# Patient Record
Sex: FEMALE | Race: WHITE | URBAN - METROPOLITAN AREA
[De-identification: names, ages, dates, MRNs, and addresses within clinical notes are randomized per-mention and may not be internally consistent; named-entity substitution may affect disease eponyms.]

---

## 2021-01-01 ENCOUNTER — OFFICE VISIT (OUTPATIENT)
Dept: ORTHOPEDIC SURGERY | Age: 86
End: 2021-01-01
Payer: MEDICARE

## 2021-01-01 VITALS — WEIGHT: 164 LBS | BODY MASS INDEX: 28 KG/M2 | HEIGHT: 64 IN

## 2021-01-01 DIAGNOSIS — M84.375G STRESS REACTION OF LEFT FOOT WITH DELAYED HEALING, SUBSEQUENT ENCOUNTER: Primary | ICD-10-CM

## 2021-01-01 PROCEDURE — 99212 OFFICE O/P EST SF 10 MIN: CPT | Performed by: ORTHOPAEDIC SURGERY

## 2021-01-01 PROCEDURE — G8536 NO DOC ELDER MAL SCRN: HCPCS | Performed by: ORTHOPAEDIC SURGERY

## 2021-01-01 PROCEDURE — G8419 CALC BMI OUT NRM PARAM NOF/U: HCPCS | Performed by: ORTHOPAEDIC SURGERY

## 2021-01-01 PROCEDURE — G8432 DEP SCR NOT DOC, RNG: HCPCS | Performed by: ORTHOPAEDIC SURGERY

## 2021-01-01 PROCEDURE — 1090F PRES/ABSN URINE INCON ASSESS: CPT | Performed by: ORTHOPAEDIC SURGERY

## 2021-01-01 PROCEDURE — 1101F PT FALLS ASSESS-DOCD LE1/YR: CPT | Performed by: ORTHOPAEDIC SURGERY

## 2021-01-01 PROCEDURE — G8427 DOCREV CUR MEDS BY ELIG CLIN: HCPCS | Performed by: ORTHOPAEDIC SURGERY

## 2021-01-01 RX ORDER — CARVEDILOL 3.12 MG/1
TABLET ORAL
COMMUNITY
Start: 2021-01-01 | End: 2022-01-01

## 2021-01-01 RX ORDER — CALCITRIOL 0.25 UG/1
CAPSULE ORAL
COMMUNITY
Start: 2021-01-01 | End: 2022-01-01

## 2021-01-01 RX ORDER — ALBUTEROL SULFATE 0.83 MG/ML
SOLUTION RESPIRATORY (INHALATION)
COMMUNITY
Start: 2021-01-01 | End: 2022-01-01

## 2021-01-01 RX ORDER — AMLODIPINE BESYLATE 5 MG/1
TABLET ORAL
COMMUNITY
End: 2022-01-01

## 2021-01-01 RX ORDER — ALBUTEROL SULFATE 90 UG/1
AEROSOL, METERED RESPIRATORY (INHALATION)
COMMUNITY
End: 2021-01-01

## 2021-12-02 NOTE — PROGRESS NOTES
Tadeo Alcantara (: 1935) is a 80 y.o. female, patient,here for evaluation of the following No chief complaint on file. ASSESSMENT/PLAN:  Below is the assessment and plan developed based on review of pertinent history, physical exam, labs, studies, and medications. 1. Stress reaction of left foot with delayed healing, subsequent encounter      Patient continues to have symptoms around the second metatarsal and toe, she was diagnosed with a stress reaction as no fracture lines were seen. She has had some improvements but not fully recovered, I did refer to physical therapy and will see her again for final check, will obtain final x-rays of left foot 3 views weightbearing. Return in about 6 weeks (around 2022) for repeat xrays. Not on File    No current outpatient medications on file. No current facility-administered medications for this visit. No past medical history on file. No past surgical history on file. No family history on file. Social History     Socioeconomic History    Marital status:      Spouse name: Not on file    Number of children: Not on file    Years of education: Not on file    Highest education level: Not on file   Occupational History    Not on file   Tobacco Use    Smoking status: Not on file    Smokeless tobacco: Not on file   Substance and Sexual Activity    Alcohol use: Not on file    Drug use: Not on file    Sexual activity: Not on file   Other Topics Concern    Not on file   Social History Narrative    Not on file     Social Determinants of Health     Financial Resource Strain:     Difficulty of Paying Living Expenses: Not on file   Food Insecurity:     Worried About Running Out of Food in the Last Year: Not on file    Beth of Food in the Last Year: Not on file   Transportation Needs:     Lack of Transportation (Medical): Not on file    Lack of Transportation (Non-Medical):  Not on file   Physical Activity:     Days of Exercise per Week: Not on file    Minutes of Exercise per Session: Not on file   Stress:     Feeling of Stress : Not on file   Social Connections:     Frequency of Communication with Friends and Family: Not on file    Frequency of Social Gatherings with Friends and Family: Not on file    Attends Taoism Services: Not on file    Active Member of [address] [place] [address] [employer] or Organizations: Not on file    Attends Club or Organization Meetings: Not on file    Marital Status: Not on file   Intimate Partner Violence:     Fear of Current or Ex-Partner: Not on file    Emotionally Abused: Not on file    Physically Abused: Not on file    Sexually Abused: Not on file   Housing Stability:     Unable to Pay for Housing in the Last Year: Not on file    Number of [place] in the Last Year: Not on file    Unstable Housing in the Last Year: Not on file           Vitals: There were no vitals taken for this visit. There is no height or weight on file to calculate BMI. SUBJECTIVE/OBJECTIVE:  Sourav Mirza (: 1935) patient here for follow-up regarding left foot pain diagnosis stress reaction around the second toe and metatarsal.  She is doing better today but not fully recovered. She was last seen for this on 2021. She continues to do well with the boot immobilization. Patient is not diabetic. Physical Exam  Pleasant well-nourished female , alert and oriented to person, time and place, no acute distress. Mild antalgic gait, normal weightbearing stance. Left ankle: Full active and passive range of motion intact, nontender, no swelling, normal exam.    Left foot:  around the second toe and metatarsal, no obvious swelling is present, no malalignment or deformities, hindfoot and toes nontender for the rest of the foot. Able to flex and extend toes suspect range of motion strength 5/5. Contralateral foot and ankle exam, nontender, no swelling ligaments grossly stable.   Normal weightbearing stance. Neurovascular exam intact for light touch sensation, cap refill, dorsalis pedis pulse palpable, flexion/extension strength 5/5. Skin intact without open wounds, lesions or ulcers, no skin discolorations, no warmth to skin. Imaging:    No x-rays were obtained, previous x-rays have been normal.        An electronic signature was used to authenticate this note.   -- Jonathan Stein MD

## 2022-01-01 ENCOUNTER — HOME CARE VISIT (OUTPATIENT)
Dept: HOSPICE | Facility: HOSPICE | Age: 87
End: 2022-01-01
Payer: MEDICARE

## 2022-01-01 ENCOUNTER — HOME CARE VISIT (OUTPATIENT)
Dept: SCHEDULING | Facility: HOME HEALTH | Age: 87
End: 2022-01-01
Payer: MEDICARE

## 2022-01-01 ENCOUNTER — HOSPICE ADMISSION (OUTPATIENT)
Dept: HOSPICE | Facility: HOSPICE | Age: 87
End: 2022-01-01
Payer: MEDICARE

## 2022-01-01 ENCOUNTER — HOSPITAL ENCOUNTER (INPATIENT)
Age: 87
LOS: 3 days | Discharge: HOME | End: 2022-02-08
Attending: FAMILY MEDICINE | Admitting: INTERNAL MEDICINE

## 2022-01-01 VITALS
DIASTOLIC BLOOD PRESSURE: 76 MMHG | TEMPERATURE: 97.9 F | OXYGEN SATURATION: 98 % | SYSTOLIC BLOOD PRESSURE: 118 MMHG | RESPIRATION RATE: 16 BRPM | HEART RATE: 95 BPM

## 2022-01-01 VITALS
TEMPERATURE: 98.2 F | RESPIRATION RATE: 16 BRPM | DIASTOLIC BLOOD PRESSURE: 74 MMHG | HEART RATE: 86 BPM | OXYGEN SATURATION: 99 % | SYSTOLIC BLOOD PRESSURE: 118 MMHG

## 2022-01-01 VITALS
HEART RATE: 72 BPM | OXYGEN SATURATION: 97 % | RESPIRATION RATE: 18 BRPM | DIASTOLIC BLOOD PRESSURE: 59 MMHG | TEMPERATURE: 97.2 F | SYSTOLIC BLOOD PRESSURE: 114 MMHG

## 2022-01-01 VITALS — TEMPERATURE: 99.4 F | OXYGEN SATURATION: 80 % | HEART RATE: 93 BPM | RESPIRATION RATE: 24 BRPM

## 2022-01-01 VITALS
TEMPERATURE: 98 F | DIASTOLIC BLOOD PRESSURE: 78 MMHG | RESPIRATION RATE: 18 BRPM | HEART RATE: 86 BPM | HEART RATE: 140 BPM | TEMPERATURE: 98 F | SYSTOLIC BLOOD PRESSURE: 122 MMHG | RESPIRATION RATE: 8 BRPM | OXYGEN SATURATION: 94 %

## 2022-01-01 VITALS
HEART RATE: 100 BPM | DIASTOLIC BLOOD PRESSURE: 70 MMHG | OXYGEN SATURATION: 96 % | TEMPERATURE: 98.7 F | SYSTOLIC BLOOD PRESSURE: 115 MMHG | RESPIRATION RATE: 18 BRPM

## 2022-01-01 VITALS — HEART RATE: 110 BPM | TEMPERATURE: 99 F | RESPIRATION RATE: 10 BRPM

## 2022-01-01 VITALS
DIASTOLIC BLOOD PRESSURE: 59 MMHG | HEART RATE: 93 BPM | TEMPERATURE: 98.7 F | RESPIRATION RATE: 18 BRPM | SYSTOLIC BLOOD PRESSURE: 93 MMHG | OXYGEN SATURATION: 99 %

## 2022-01-01 VITALS
OXYGEN SATURATION: 99 % | DIASTOLIC BLOOD PRESSURE: 67 MMHG | TEMPERATURE: 97.6 F | HEART RATE: 83 BPM | RESPIRATION RATE: 16 BRPM | SYSTOLIC BLOOD PRESSURE: 126 MMHG

## 2022-01-01 VITALS — HEART RATE: 112 BPM | RESPIRATION RATE: 10 BRPM | TEMPERATURE: 98 F

## 2022-01-01 VITALS — TEMPERATURE: 98.2 F | HEART RATE: 140 BPM | RESPIRATION RATE: 22 BRPM

## 2022-01-01 VITALS — TEMPERATURE: 98.1 F | RESPIRATION RATE: 16 BRPM | OXYGEN SATURATION: 98 % | HEART RATE: 96 BPM

## 2022-01-01 VITALS
TEMPERATURE: 98.1 F | SYSTOLIC BLOOD PRESSURE: 119 MMHG | HEART RATE: 110 BPM | RESPIRATION RATE: 18 BRPM | OXYGEN SATURATION: 92 % | DIASTOLIC BLOOD PRESSURE: 66 MMHG

## 2022-01-01 VITALS
OXYGEN SATURATION: 99 % | RESPIRATION RATE: 16 BRPM | DIASTOLIC BLOOD PRESSURE: 74 MMHG | HEART RATE: 94 BPM | TEMPERATURE: 97.8 F | SYSTOLIC BLOOD PRESSURE: 117 MMHG

## 2022-01-01 VITALS
DIASTOLIC BLOOD PRESSURE: 78 MMHG | OXYGEN SATURATION: 99 % | RESPIRATION RATE: 18 BRPM | SYSTOLIC BLOOD PRESSURE: 136 MMHG | HEART RATE: 79 BPM | TEMPERATURE: 97.2 F

## 2022-01-01 VITALS
SYSTOLIC BLOOD PRESSURE: 122 MMHG | RESPIRATION RATE: 22 BRPM | DIASTOLIC BLOOD PRESSURE: 68 MMHG | OXYGEN SATURATION: 96 % | HEART RATE: 98 BPM

## 2022-01-01 VITALS — TEMPERATURE: 98.1 F | HEART RATE: 106 BPM | RESPIRATION RATE: 12 BRPM

## 2022-01-01 VITALS
SYSTOLIC BLOOD PRESSURE: 98 MMHG | TEMPERATURE: 98.7 F | DIASTOLIC BLOOD PRESSURE: 72 MMHG | HEART RATE: 120 BPM | RESPIRATION RATE: 20 BRPM | OXYGEN SATURATION: 97 %

## 2022-01-01 VITALS — HEART RATE: 110 BPM

## 2022-01-01 DIAGNOSIS — R11.0 NAUSEA: ICD-10-CM

## 2022-01-01 DIAGNOSIS — R10.9 ABDOMINAL PAIN, UNSPECIFIED ABDOMINAL LOCATION: ICD-10-CM

## 2022-01-01 DIAGNOSIS — R53.83 FATIGUE, UNSPECIFIED TYPE: ICD-10-CM

## 2022-01-01 DIAGNOSIS — K59.03 DRUG-INDUCED CONSTIPATION: ICD-10-CM

## 2022-01-01 DIAGNOSIS — R07.9 CHEST PAIN, UNSPECIFIED TYPE: ICD-10-CM

## 2022-01-01 DIAGNOSIS — I25.119 ATHEROSCLEROSIS OF NATIVE CORONARY ARTERY OF NATIVE HEART WITH ANGINA PECTORIS (HCC): ICD-10-CM

## 2022-01-01 DIAGNOSIS — Z51.5 HOSPICE CARE: ICD-10-CM

## 2022-01-01 DIAGNOSIS — K13.70 ORAL LESION: ICD-10-CM

## 2022-01-01 DIAGNOSIS — G47.00 INSOMNIA, UNSPECIFIED TYPE: ICD-10-CM

## 2022-01-01 DIAGNOSIS — F41.9 ANXIETY: ICD-10-CM

## 2022-01-01 PROCEDURE — G0299 HHS/HOSPICE OF RN EA 15 MIN: HCPCS

## 2022-01-01 PROCEDURE — 74011000250 HC RX REV CODE- 250: Performed by: NURSE PRACTITIONER

## 2022-01-01 PROCEDURE — 0651 HSPC ROUTINE HOME CARE

## 2022-01-01 PROCEDURE — HOSPICE MEDICATION HC HH HOSPICE MEDICATION

## 2022-01-01 PROCEDURE — G0300 HHS/HOSPICE OF LPN EA 15 MIN: HCPCS

## 2022-01-01 PROCEDURE — 74011250637 HC RX REV CODE- 250/637: Performed by: NURSE PRACTITIONER

## 2022-01-01 PROCEDURE — 74011250637 HC RX REV CODE- 250/637: Performed by: INTERNAL MEDICINE

## 2022-01-01 PROCEDURE — G0156 HHCP-SVS OF AIDE,EA 15 MIN: HCPCS

## 2022-01-01 PROCEDURE — 74011250636 HC RX REV CODE- 250/636: Performed by: NURSE PRACTITIONER

## 2022-01-01 PROCEDURE — 0656 HSPC GENERAL INPATIENT

## 2022-01-01 PROCEDURE — G0155 HHCP-SVS OF CSW,EA 15 MIN: HCPCS

## 2022-01-01 PROCEDURE — 3336500001 HSPC ELECTION

## 2022-01-01 PROCEDURE — 3331090004 HSPC SERVICE INTENSITY ADD-ON

## 2022-01-01 PROCEDURE — 99233 SBSQ HOSP IP/OBS HIGH 50: CPT | Performed by: INTERNAL MEDICINE

## 2022-01-01 RX ORDER — POLYETHYLENE GLYCOL 3350 17 G/17G
17 POWDER, FOR SOLUTION ORAL DAILY
Status: DISCONTINUED | OUTPATIENT
Start: 2022-01-01 | End: 2022-01-01 | Stop reason: HOSPADM

## 2022-01-01 RX ORDER — ACETAMINOPHEN 325 MG/1
650 TABLET ORAL
Status: DISCONTINUED | OUTPATIENT
Start: 2022-01-01 | End: 2022-01-01 | Stop reason: HOSPADM

## 2022-01-01 RX ORDER — HALOPERIDOL 2 MG/ML
2 SOLUTION ORAL
Status: DISCONTINUED | OUTPATIENT
Start: 2022-01-01 | End: 2022-01-01 | Stop reason: HOSPADM

## 2022-01-01 RX ORDER — ONDANSETRON 4 MG/1
4 TABLET, ORALLY DISINTEGRATING ORAL
Status: DISCONTINUED | OUTPATIENT
Start: 2022-01-01 | End: 2022-01-01 | Stop reason: HOSPADM

## 2022-01-01 RX ORDER — HYDROMORPHONE HYDROCHLORIDE 5 MG/5ML
0.5 SOLUTION ORAL EVERY 4 HOURS
Status: DISCONTINUED | OUTPATIENT
Start: 2022-01-01 | End: 2022-01-01

## 2022-01-01 RX ORDER — IPRATROPIUM BROMIDE AND ALBUTEROL SULFATE 2.5; .5 MG/3ML; MG/3ML
3 SOLUTION RESPIRATORY (INHALATION)
Status: DISCONTINUED | OUTPATIENT
Start: 2022-01-01 | End: 2022-01-01 | Stop reason: HOSPADM

## 2022-01-01 RX ORDER — HYDROMORPHONE HYDROCHLORIDE 5 MG/5ML
2 SOLUTION ORAL
Status: DISCONTINUED | OUTPATIENT
Start: 2022-01-01 | End: 2022-01-01 | Stop reason: HOSPADM

## 2022-01-01 RX ORDER — HYDROMORPHONE HYDROCHLORIDE 1 MG/ML
0.5 INJECTION, SOLUTION INTRAMUSCULAR; INTRAVENOUS; SUBCUTANEOUS
Status: DISCONTINUED | OUTPATIENT
Start: 2022-01-01 | End: 2022-01-01

## 2022-01-01 RX ORDER — HYDROMORPHONE HYDROCHLORIDE 5 MG/5ML
1 SOLUTION ORAL EVERY 4 HOURS
Status: DISCONTINUED | OUTPATIENT
Start: 2022-01-01 | End: 2022-01-01 | Stop reason: HOSPADM

## 2022-01-01 RX ORDER — DIAZEPAM 2 MG/1
2 TABLET ORAL
Status: DISCONTINUED | OUTPATIENT
Start: 2022-01-01 | End: 2022-01-01 | Stop reason: HOSPADM

## 2022-01-01 RX ORDER — HYOSCYAMINE SULFATE 0.12 MG/1
0.12 TABLET SUBLINGUAL
Status: DISCONTINUED | OUTPATIENT
Start: 2022-01-01 | End: 2022-01-01 | Stop reason: HOSPADM

## 2022-01-01 RX ORDER — FACIAL-BODY WIPES
10 EACH TOPICAL DAILY PRN
Status: DISCONTINUED | OUTPATIENT
Start: 2022-01-01 | End: 2022-01-01 | Stop reason: HOSPADM

## 2022-01-01 RX ORDER — PROCHLORPERAZINE MALEATE 5 MG
10 TABLET ORAL
Status: DISCONTINUED | OUTPATIENT
Start: 2022-01-01 | End: 2022-01-01 | Stop reason: HOSPADM

## 2022-01-01 RX ORDER — ACETAMINOPHEN 650 MG/1
650 SUPPOSITORY RECTAL
Status: DISCONTINUED | OUTPATIENT
Start: 2022-01-01 | End: 2022-01-01 | Stop reason: HOSPADM

## 2022-01-01 RX ORDER — TRIAMCINOLONE ACETONIDE 1 MG/G
PASTE DENTAL 2 TIMES DAILY
Qty: 5 G | Refills: 1 | Status: SHIPPED | OUTPATIENT
Start: 2022-01-01

## 2022-01-01 RX ORDER — HYDROMORPHONE HYDROCHLORIDE 5 MG/5ML
1 SOLUTION ORAL
Status: DISCONTINUED | OUTPATIENT
Start: 2022-01-01 | End: 2022-01-01

## 2022-01-01 RX ORDER — AMOXICILLIN 250 MG
1 CAPSULE ORAL 2 TIMES DAILY
Status: DISCONTINUED | OUTPATIENT
Start: 2022-01-01 | End: 2022-01-01

## 2022-01-01 RX ADMIN — Medication 10 MG: at 10:45

## 2022-01-01 RX ADMIN — ACETAMINOPHEN 650 MG: 325 TABLET ORAL at 08:01

## 2022-01-01 RX ADMIN — Medication 5 ML: at 21:02

## 2022-01-01 RX ADMIN — Medication 10 MG: at 14:45

## 2022-01-01 RX ADMIN — Medication 1 MG: at 13:59

## 2022-01-01 RX ADMIN — Medication 10 MG: at 10:25

## 2022-01-01 RX ADMIN — HYDROMORPHONE HYDROCHLORIDE 0.5 MG: 5 SOLUTION ORAL at 16:27

## 2022-01-01 RX ADMIN — HYDROMORPHONE HYDROCHLORIDE 0.5 MG: 5 SOLUTION ORAL at 12:19

## 2022-01-01 RX ADMIN — Medication 10 MG: at 10:30

## 2022-01-01 RX ADMIN — NITROGLYCERIN 2 INCH: 20 OINTMENT TOPICAL at 14:18

## 2022-01-01 RX ADMIN — HYDROMORPHONE HYDROCHLORIDE 1 MG: 5 SOLUTION ORAL at 10:52

## 2022-01-01 RX ADMIN — NITROGLYCERIN 2 INCH: 20 OINTMENT TOPICAL at 07:53

## 2022-01-01 RX ADMIN — HYDROMORPHONE HYDROCHLORIDE 0.5 MG: 1 INJECTION, SOLUTION INTRAMUSCULAR; INTRAVENOUS; SUBCUTANEOUS at 13:20

## 2022-01-01 RX ADMIN — Medication 1 MG: at 10:50

## 2022-01-01 RX ADMIN — NITROGLYCERIN 2 INCH: 20 OINTMENT TOPICAL at 14:01

## 2022-01-01 RX ADMIN — HYOSCYAMINE SULFATE 0.12 MG: 0.12 TABLET SUBLINGUAL at 09:45

## 2022-01-01 RX ADMIN — NITROGLYCERIN 2 INCH: 20 OINTMENT TOPICAL at 02:00

## 2022-01-01 RX ADMIN — NITROGLYCERIN 2 INCH: 20 OINTMENT TOPICAL at 20:03

## 2022-01-01 RX ADMIN — NITROGLYCERIN 2 INCH: 20 OINTMENT TOPICAL at 20:07

## 2022-01-01 RX ADMIN — HYDROMORPHONE HYDROCHLORIDE 1 MG: 5 SOLUTION ORAL at 12:50

## 2022-01-01 RX ADMIN — Medication 1 MG: at 18:05

## 2022-01-01 RX ADMIN — Medication 10 MG: at 09:30

## 2022-01-01 RX ADMIN — Medication 1 MG: at 02:00

## 2022-01-01 RX ADMIN — IPRATROPIUM BROMIDE AND ALBUTEROL SULFATE 3 ML: .5; 2.5 SOLUTION RESPIRATORY (INHALATION) at 16:32

## 2022-01-01 RX ADMIN — HYDROMORPHONE HYDROCHLORIDE 0.5 MG: 5 SOLUTION ORAL at 05:03

## 2022-01-01 RX ADMIN — NITROGLYCERIN 2 INCH: 20 OINTMENT TOPICAL at 08:47

## 2022-01-01 RX ADMIN — DOCUSATE SODIUM 50 MG AND SENNOSIDES 8.6 MG 1 TABLET: 8.6; 5 TABLET, FILM COATED ORAL at 08:52

## 2022-01-01 RX ADMIN — Medication 5 ML: at 08:46

## 2022-01-01 RX ADMIN — ACETAMINOPHEN 650 MG: 325 TABLET ORAL at 10:18

## 2022-01-01 RX ADMIN — DOCUSATE SODIUM 50 MG AND SENNOSIDES 8.6 MG 1 TABLET: 8.6; 5 TABLET, FILM COATED ORAL at 14:18

## 2022-01-01 RX ADMIN — POLYETHYLENE GLYCOL 3350 17 G: 17 POWDER, FOR SOLUTION ORAL at 08:47

## 2022-01-01 RX ADMIN — HYDROMORPHONE HYDROCHLORIDE 0.5 MG: 1 INJECTION, SOLUTION INTRAMUSCULAR; INTRAVENOUS; SUBCUTANEOUS at 10:34

## 2022-01-01 RX ADMIN — HYDROMORPHONE HYDROCHLORIDE 0.5 MG: 5 SOLUTION ORAL at 00:53

## 2022-01-01 RX ADMIN — DIAZEPAM 2 MG: 2 TABLET ORAL at 06:37

## 2022-01-01 RX ADMIN — NITROGLYCERIN 2 INCH: 20 OINTMENT TOPICAL at 14:09

## 2022-01-01 RX ADMIN — POLYETHYLENE GLYCOL 3350 17 G: 17 POWDER, FOR SOLUTION ORAL at 07:52

## 2022-01-01 RX ADMIN — HYDROMORPHONE HYDROCHLORIDE 1 MG: 5 SOLUTION ORAL at 10:05

## 2022-01-01 RX ADMIN — NITROGLYCERIN 2 INCH: 20 OINTMENT TOPICAL at 20:05

## 2022-01-01 RX ADMIN — NITROGLYCERIN 2 INCH: 20 OINTMENT TOPICAL at 08:52

## 2022-01-01 RX ADMIN — Medication 1 MG: at 06:18

## 2022-01-01 RX ADMIN — DIAZEPAM 2 MG: 2 TABLET ORAL at 00:59

## 2022-01-01 RX ADMIN — HALOPERIDOL 2 MG: 2 SOLUTION ORAL at 10:00

## 2022-01-01 RX ADMIN — HYDROMORPHONE HYDROCHLORIDE 0.5 MG: 5 SOLUTION ORAL at 20:41

## 2022-01-01 RX ADMIN — HYDROMORPHONE HYDROCHLORIDE 0.5 MG: 1 INJECTION, SOLUTION INTRAMUSCULAR; INTRAVENOUS; SUBCUTANEOUS at 20:06

## 2022-01-01 RX ADMIN — NITROGLYCERIN 2 INCH: 20 OINTMENT TOPICAL at 01:49

## 2022-01-01 RX ADMIN — HYDROMORPHONE HYDROCHLORIDE 2 MG: 5 SOLUTION ORAL at 12:51

## 2022-01-01 RX ADMIN — DIAZEPAM 2 MG: 2 TABLET ORAL at 21:57

## 2022-01-01 RX ADMIN — HYDROMORPHONE HYDROCHLORIDE 2 MG: 5 SOLUTION ORAL at 10:00

## 2022-01-01 RX ADMIN — HYOSCYAMINE SULFATE 0.12 MG: 0.12 TABLET SUBLINGUAL at 14:30

## 2022-01-01 RX ADMIN — NITROGLYCERIN 2 INCH: 20 OINTMENT TOPICAL at 02:02

## 2022-01-01 RX ADMIN — HYDROMORPHONE HYDROCHLORIDE 0.5 MG: 5 SOLUTION ORAL at 08:53

## 2022-01-01 RX ADMIN — Medication 1 MG: at 21:57

## 2022-02-02 PROBLEM — Z51.5 HOSPICE CARE: Status: ACTIVE | Noted: 2022-01-01

## 2022-02-03 NOTE — HOSPICE
LMSW received message from Lian requesting scheduled visit for today be rescheduled for 4 pm Monday so that patient's son-in-law and patient's other daughter could attend. LMSW agreed to reschedule visit and will conduct assessment at agreed upon time. Hospice MD notified.

## 2022-02-04 NOTE — HOSPICE
Received patient in hospital bed in her room in [facility] and brief. Patient remains alert and oriented x2-3 with periods of forgetful and confusion per family. Patient transferred yesterday to [facility] from another local hospice secondary to service issues. Daughter Lian and son in law present during visit as well as Tanmay Joavrilkirby, a paid caregiver. Discussed hospice visits, schedule, HHA visits, and visiting nurse. RNCM went over list of patient's medications and allergies with daughter. Will bring printed list at next visit. Discussed plan of care and goals for patient's comfort and safety. Discussed home oxygen use. RNCM posted No Smoking sign on patient's front door. Daughter remarked that this is more education than she ever received with previous hospice. Instructed family on how to contact hospice and RNCM. Patient has arevalo catheter draining yellow urine without difficulty. Catheter secure on wrong leg. New secure device placed and old one removed. Patient has constipation issues, was given dulcolax suppository yesterday by admission nurse. Caregiver reports patient had two large bowel movements last night. Abdomen soft and nontender with positive bowel sounds in all four quadrants. Encouraged patient's daughter to call hospice 24/7 with any needs or change in patient's condition. Instructed patient's daughter that RNCM will return for visit on Saturday. Family express appreciation for all care received.

## 2022-02-05 NOTE — HOSPICE
Followed up by text with patient's daughter this morning due to PRN visit last night for pain not well controlled. Daughter Lian reports mother still says her pain level is 7/10 this morning. Asked Lian if the family would like patient to go to [facility] for symptom management and Lian says yes they would. RNCM emailed weekend team. Bed available. HALI Herrmann called with request to send documents to daughter via [employer] and arrange transport. RNCM arrived at home to find patient lightly resting, but grimaced forehead noted. Encouraged daughter to repeat Dilaudid 1 mg SL. Last dose was 0815, patient also given Valium 2 mg tablet at that time. Rapid COVID test performed with negative result. Result sent to [facility] staff via email. Patient now appears more sleepy. Heart rate is regular but tachycardic at 100 bpm. Lungs clear with diminished bases bilaterally. O2 sat 97% on 3L/min via NC. Bowling draining yellow hazy urine without difficulty. Instructed family to take the patient's medications to hospice house minus liquid symptom relief medications. Also instructed to bring patient's nebulizer should that be needed, as well as gown, personal items and DNR. At 10:15, encouraged daughter to give another 1 mg dilaudid SL. Daughter also gave patient Zofran 4 mg to prevent nausea and vomiting from pain medications. Forms received via [employer] and [place] Herrmann called with ETA of 11:30 for transport. RNCM departed at 10:45 with patient sleeping comfortably and snoring. Report called to [facility].  GIP order received from Jennifer Evans NP.

## 2022-02-05 NOTE — HOSPICE
Visit Type: Chest Pain    Upon arrival to residence this writer was met at the door by the [facility] and escorted to patient's room. Present was patient's daughter and PCG. Patient awake alert and oriented. Describe pain as being across chest from left to right. Patient could not describe quality of pain but said it was consistent and she was \"in pain\". Patient skin is warm and dry no pitting edema noted Patient denies any dyspnea, fatigue, dizziness, or changes in LOC. No clicks, rubs, or murmurs noted on auscultation. Cap refill <3 sec pulses present. Daughter reports around 2000, patient had a large and difficult bowel movement and soon after that, the chest pain started. Per daughter patient was given Dilaudid 1 mg at 2115, 2150 and 2235. Valium 2 mg was given in half doses between 2145 and 2150. Nitro patch with 2 inch strip of ointment was at 2235 and at 1900 before that. Patient reports no relief at this time. This writer contacted Valencia Costa NP at [phone] who gave V.O to administer Dilaudid 1 mg Q 30 minutes x 4 doses and if pain is unrelieved, to increase medication to 1.5 mg  Q 30 minutes to 1 hour, VORB. Daughter administered first dose of medications at 0000. by 0020, there was no relief as patient states, \"I'm hurting. \" daughter administered 2nd dose of Dilaudid. Patient states pain was relieved \"a little bit\" 3rd dose administered at 0048 by daughter. Patient repositioned to lie on side. Patient thanked all parties involved and said sendy. This writer provided education of catheter positioning when turning the patient, elevating HOB when administering fluids and meal, and medication administration. Daughter verbalized understanding of education. At completion of visit, patient was resting with her eyes closed. Writer encouraged daughter to rest but to contact triage if she had any questions or concerns.

## 2022-02-05 NOTE — PROGRESS NOTES
Problem: Pressure Injury - Risk of  Goal: *Prevention of pressure injury  Description: Document Wilner Scale and appropriate interventions in the flowsheet. Outcome: Progressing Towards Goal  Note: Pressure Injury Interventions:  Sensory Interventions: Assess need for specialty bed,Check visual cues for pain,Float heels,Keep linens dry and wrinkle-free,Maintain/enhance activity level,Minimize linen layers,Pad between skin to skin,Pressure redistribution bed/mattress (bed type),Turn and reposition approx. every two hours (pillows and wedges if needed)    Moisture Interventions: Internal/External urinary devices,Maintain skin hydration (lotion/cream),Minimize layers,Moisture barrier    Activity Interventions: Pressure redistribution bed/mattress(bed type)    Mobility Interventions: Float heels,HOB 30 degrees or less,Pressure redistribution bed/mattress (bed type),Turn and reposition approx. every two hours(pillow and wedges)    Nutrition Interventions: Document food/fluid/supplement intake,Offer support with meals,snacks and hydration    Friction and Shear Interventions: Apply protective barrier, creams and emollients,HOB 30 degrees or less,Minimize layers                Problem: Patient Education: Go to Patient Education Activity  Goal: Patient/Family Education  Outcome: Progressing Towards Goal     Problem: Potential for Alteration in Skin Integrity  Goal: Monitor skin for areas of alteration in skin integrity  Description: Patient/family/caregiver will demonstrate ability to care for patient's skin, monitor for areas of breakdown, and demonstrate methods to prevent breakdown during hospice care. Outcome: Progressing Towards Goal     Problem: Risk for Falls  Goal: Free of falls during inpatient stay  Description: Patient will be free of falls during inpatient stay.   Outcome: Progressing Towards Goal     Problem: Alteration in Mobility  Goal: Remain as independent as possible and remain safe in environment  Description: Patient will remain as independent as possible and remain safe in their environment. Outcome: Progressing Towards Goal     Problem: Pain  Goal: Assess satisfaction of level of comfort and symptom control  Outcome: Progressing Towards Goal  Goal: *Control of acute pain  Outcome: Progressing Towards Goal     Problem: Anxiety/Agitation  Goal: Verbalize or staff assess the ability to manage anxiety  Description: The patient/family/caregiver will verbalize and demonstrate ability to manage the patient's anxiety throughout hospice care.   Outcome: Progressing Towards Goal     Problem: Infection - Risk of, Urinary Catheter-Associated Urinary Tract Infection  Goal: *Absence of infection signs and symptoms  Outcome: Progressing Towards Goal     Problem: Discharge Planning  Goal: *Participates in discharge planning  Outcome: Progressing Towards Goal

## 2022-02-05 NOTE — PROGRESS NOTES
1220: pt arrived via stretcher transport. Pt transferred into bed. AO x3. Pt states pain in her abdomen and chest 7/10. Full assessment done. 1250: SL dilaudid administered. Daughter at bedside, updated her on POC. RN to call NP on call for any additional orders. 1300: RN placed call to Sergei Rao NP. Telephone verbal orders to start IV 0.5mg dilaudid q1 hour PRN and continue her PO valium as well as her Nitro patch. Family updated. 1320: Subcut line placed in R arm. IV dilaudid given per MAR.  1400: pt resting quietly with eyes closed. Family reports she does appear comfortable compared to how she was during the night. They stated during the night, she was holding her chest, grimacing, moving around with discomfort. Will continue to monitor. 1410: nitro patch removed from R chest and new one placed on L chest. Pt sleeping but arouses while RN at bedside, very groggy due to IV dilaudid. Will let patient rest. Family remains at bedside. All questions answered, RN provided education to them regarding [facility], patient cares, rounding, meds, etc. Verbalized understanding. 1525: pt resting quietly with eyes closed. Open mouth breathing, respirations even and unlabored. Neutral facial expression. Family at bedside. 1640: pt resting quietly with eyes closed, respirations even and unlabored. Pt arouses to my voice, states No when I ask her if she is having any pain. daughter attempting to get her to take sips of water. RN educated on letting patient rest now that pain is controlled. 1710: second daughter and son-in-law arrived from out of town. SW in room visiting with family. 1720: SW called for RN, stated patient had to go to the bathroom. RN and CNA to bedside. Pt awake, lethargic and disoriented. RN reoriented patient and reminded her that she had a arevalo. Patient denied feeling the need to have a BM. Will continue to monitor.    1800: pt resting quietly, wakes up intermittently and then drifts off back to sleep. Pt repositioned to R side and propped with pillows for comfort. RN educated family on POC for night shift, pain management, monitoring for nonverbal signs and symptoms of pain. RN encouraged family to get rest tonight. Family verbalized understanding. 1845: pt resting quietly with eyes closed, respirations even and unlabored. Daughters remain at bedside. 1900: report given to oncoming nurse. NAME OF PATIENT:  Dick Richardsonnesneil    LEVEL OF CARE:  [facility]    REASON FOR GIP:   Pain, despite numerous changes in medications, Medication adjustment that must be monitored 24/7 and Stabilizing treatment that cannot take place at home    *PATIENT REMAINS ELIGIBLE FOR GIP LEVEL OF CARE AS EVIDENCED BY: (MUST BE ADDRESSED OF PATIENT GIP) pt is requiring frequent nursing assessments to monitor her symptoms and IV medications to manage her pain.        REASON FOR RESPITE:  n/a    O2 SAFETY:  Concentrator positioning (6\" from furniture/drapes), Tanks stored in fisher , No petroleum based products on face while oxygen in use and Oxygen sign on the door    FALL INTERVENTIONS PROVIDED:   Implemented/recommended use of non-skid footwear, Implemented/recommended use of fall risk identification flag to all team members, Implemented/recommended assistive devices and encouraged their use, Implemented/recommended resources for alarm system (personal alarm, bed alarm, call bell, etc.) , Implemented/recommended environmental changes (remove hazards, lower bed, improve lighting, etc.) and Implemented/recommended increased supervision/assistance    INTERDISPLINARY COMMUNICATION/COLLABORATION:  Physician, MSW, Jose and RN, CNA    NEW MEDICATION INITIATION DOCUMENTATION:  Consulted AT MD to report change in pt status, Obtained Order from Provider for initiation of symptom relief medication /other medication needed and Documentation completed in Clinical Note in [facility]    Reason medication is being initiated:  Continued pain despite frequent dosing of SL dilaudid at home    MD / Provider name consulted re: change in status / initiation of new medication:  Rosa Simpson    New Symptom(s):  Increased pain    New Order(s):  0.5mg IV dilaudid q1 hour PRN    Name of the person notified of the changes:  Daughter Lian    Name of person being taught:  Lian    Instructions given:  yes    Side Effects taught:  yes    Response to teaching:  Verbalized understanding      COMFORTABLE DYING MEASURE:  Is Patient/family satisfied with symptom level?  yes    DISCHARGE PLAN:  Return home once symptoms are managed and continue to be followed by home hospice.

## 2022-02-06 NOTE — H&P
[address] [place] [address]   [facility] in [facility]  [phone]    Patient Name: Sunni Barnes  YOB: 1935    Date of Provider Hospice Visit: 02/06/22    Level of Care:   [x] General Inpatient (GIP)    [] Routine   [] Respite    Current Location of Care:  [] [facility] [] [facility] [] [facility] [] [facility] [x] Hospice [facility], patient referred from:  [] [facility] [] [facility] [] [facility] [] [address] [place] [address] [x] Home [] Other:     Date of Original Hospice Admission: 12/5/2022  Hospice Medical Director at time of admission: Evelyn Greene MD    Principle Hospice Diagnosis: Atherosclerotic heart disease of native coronary artery, unstable angina pectoralis  Diagnoses RELATED to the terminal prognosis: Non-ST elevation myocardial infarction, chronic obstructive pulmonary disease, 20-pack-year smoking history (half a pack for 40 years, quit 20 years ago), coronary artery disease, chronic kidney disease, peripheral vascular disease,   Other Diagnoses: Anemia, diverticulitis, fem-fem bypass, pmhx perforation of intestine with surgical intervention, obstructive sleep apnea, pmhx non-hodgkin lymphoma, presence of urogenital implants       HOSPICE SUMMARY   Do not cut and paste chart information other than imaging findings    Sunni Barnes is a 80y.o. year old who was admitted to [address] [place] [address]. The patient's principle diagnosis has resulted in Chest pain that was not able to be controlled in the home  Refer to LCD     Functionally, the patient's Karnofsky and/or Palliative Performance Scale has declined over a period of weeks and is estimated at 30. The patient is dependent on all ADLs:    Objective information that support this patients limited prognosis includes:  The patient's disease has continued to progress AEB periods of debility/bedbound status, disorientation, increased dyspnea/oxygen dependence, frequent episodes of chest pain unrelieved with NTG paste and requiring PRN doses of opioids, generalized pain    The patient/family chose comfort measures with the support of Hospice. HOSPICE DIAGNOSES   Active Symptoms:  1. Chest pain  2. Abdominal Pain  3. Fatigue  4. Nausea  5. Opioid induced constipation     PLAN     1. Met with Ms Ton Deng and her two daughters who were at bedside. Ms Ton Deng is doing much better today, not utilizing the sub q hydromorphone for unrelieved pain, but she still is having some significant discomfort especially with movement  2. Family feels that her pain is being caused by the senna, but she also has nondescript discomfort evidenced by her clearly not being able to get comfortable in the med with constant shifting  3. I talked at length with the daughters and we agreed on the following plan  1. Will schedule her home dose of 0.5mg Hydromorphone every 4 hours. If she is too lethargic with this dose, we can back off to every 6 hours, and if she seems to be sleeping well overnight, it is OK to skip a dose and not wake her  2. Will leave the sub q dose as a PRN while she is here, in the event that she is not able to have good pain control again   3. Will stop the Senna, but give her Miralax instead as she is currently drinking quite a bit- this might need to be adjusted as she reportedly does not drink much at home  4. Family is hoping to take her back home tomorrow if she does OK with sublingual meds and doesn't require the sub q rescue dose  5. They asked about the possibility of a SL Zofran tablet for home- will defer this to the team tomorrow if she is able to be discharged. Right now her nausea is OK, but they worry that it will escalate   6.  and SW to support family needs  7. Disposition: home, possibly tomorrow  8.  Hospice Plan of care was reviewed in detail and agree with current plan of care    Prognosis estimated based on 02/06/22 clinical assessment is:   [] Hours to Days    [] Days to Weeks    [x] Other: 6 months or less    Communicated plan of care with: Hospice Case Manager; Hospice IDT; Care Team     GOALS OF CARE     Patient/Medical POA stated Goal of Care: pain control, comfort, and the ability to do things she wants to do    [x] I have reviewed and/or updated ACP information in the Advance Care Planning Navigator. This information is available in the [address] link in the patient's chart header. Primary Decision Maker (Health Care Agent):   Primary Decision MakerFlorin Auguste Child - [phone]  Along with her sister who lives in [place]    Resuscitation Status: DNR  If DNR is there a Durable DNR on file? : [x] Yes [] No (If no, complete Durable DNR)    HISTORY     History obtained from: family, chart  CHIEF COMPLAINT: General discomfort and belly pain when she rolls  The patient is:   [x] Verbal  [] Nonverbal  [] Unresponsive    HPI/SUBJECTIVE:  Ms Slivia Vann had pizza Friday night which she thinks triggered her extreme chest pain, followed by a particularly hard bowel movement which made it even worse.   Through the night, she was never able to get good pain control with her oral hydromorphone, so the decision was made Saturday to bring her to the hospice house to see if IV or SQ would work better       REVIEW OF SYSTEMS     The following systems were: [x] reviewed  [] unable to be reviewed    Positive ROS include:  Constitutional: fatigue, weakness, in pain  Ears/nose/mouth/throat:   Respiratory  Gastrointestinal:poor appetite, abdominal pain, constipation  Musculoskeletal:  Neurologic:weakness    Adult Non-Verbal Pain Assessment Score: 4    Face  [] 0   No particular expression or smile  [] 1   Occasional grimace, tearing, frowning, wrinkled forehead  [x] 2   Frequent grimace, tearing, frowning, wrinkled forehead    Activity (movement)  [] 0   Lying quietly, normal position  [x] 1   Seeking attention through movement or slow, cautious movement  [] 2   Restless, excessive activity and/or withdrawal reflexes    Guarding  [] 0   Lying quietly, no positioning of hands over areas of body  [x] 1   Splinting areas of the body, tense  [] 2   Rigid, stiff    Physiology (vital signs)  [x] 0   Stable vital signs  [] 1   Change in any of the following: SBP > 20mm Hg; HR > 20/minute  [] 2   Change in any of the following: SBP > 30mm Hg; HR > 25/minute    Respiratory  [x] 0   Baseline RR/SpO2, compliant with ventilator  [] 1   RR > 10 above baseline, or 5% drop SpO2, mild asynchrony with ventilator  [] 2   RR > 20 above baseline, or 10% drop SpO2, asynchrony with ventilator     FUNCTIONAL ASSESSMENT     Palliative Performance Scale (PPS):       PSYCHOSOCIAL/SPIRITUAL ASSESSMENT     Active Problems:    * No active hospital problems. *    No past medical history on file. No past surgical history on file. Social History     Tobacco Use    Smoking status: Never Smoker    Smokeless tobacco: Never Used   Substance Use Topics    Alcohol use: Not Currently     No family history on file.    Allergies   Allergen Reactions    Ace Inhibitors Cough    Pantoprazole Nausea Only    Sulfamethoxazole-Trimethoprim Hives    Atorvastatin Myalgia    Erythromycin Base Nausea Only    Levofloxacin Other (comments)    Lorazepam Other (comments)     Paradoxical reaction    Pravastatin Myalgia    Seafood Other (comments)    Tramadol Other (comments)    Cefuroxime Hives    Methenamine Nausea Only    Penicillins Hives and Rash     hives  hives      Propoxyphene Other (comments)     Doesn't recall  tolerates norcoaltered mental status  tolerates norcoaltered mental status        Current Facility-Administered Medications   Medication Dose Route Frequency    acetaminophen (TYLENOL) tablet 650 mg  650 mg Oral Q4H PRN    HYDROmorphone (DILAUDID) 1 mg/mL oral solution 0.5 mg  0.5 mg Oral Q4H    [START ON 2/7/2022] polyethylene glycol (MIRALAX) packet 17 g  17 g Oral DAILY    bisacodyL (DULCOLAX) suppository 10 mg  10 mg Rectal DAILY PRN    haloperidol (HALDOL) 2 mg/mL oral solution 2 mg  2 mg SubLINGual Q6H PRN    acetaminophen (TYLENOL) suppository 650 mg  650 mg Rectal Q4H PRN    hyoscyamine SL (LEVSIN/SL) tablet 0.125 mg  0.125 mg SubLINGual Q4H PRN    diazePAM (VALIUM) tablet 2 mg  2 mg Oral Q6H PRN    prochlorperazine (COMPAZINE) tablet 10 mg  10 mg Oral Q6H PRN    HYDROmorphone (DILAUDID) injection 0.5 mg  0.5 mg IntraVENous Q1H PRN    albuterol-ipratropium (DUO-NEB) 2.5 MG-0.5 MG/3 ML (Patient Supplied)  3 mL Nebulization Q4H PRN    nitroglycerin (NITROBID) 2 % ointment 2 Inch (Patient Supplied)  2 Inch Topical Q6H        PHYSICAL EXAM     Wt Readings from Last 3 Encounters:   12/02/21 164 lb (74.4 kg)       Visit Vitals  /69 (BP 1 Location: Left upper arm, BP Patient Position: Reclining)   Pulse 88   Temp 98.4 °F (36.9 °C)   Resp 20   SpO2 98%       Supplemental O2  [] Yes  [] NO  Last bowel movement: 2/4/22    Currently this patient has:  [] Peripheral IV [] PICC  [] PORT [] ICD    [] Bowling Catheter [] NG Tube   [] PEG Tube    [] Rectal Tube [] Drain  [] Other:     Constitutional: resting, but appears uncomfortable  Eyes: normal  ENMT: normal  Cardiovascular:   Respiratory: not labored  Gastrointestinal: not tender on palpation, but tender when she rolls over  Musculoskeletal: no deformities  Skin: intact, warm  Neurologic: very tired  Psychiatric: normal  Other:       Pertinent Lab and or Imaging Tests:  No results found for: NA, K, CL, CO2, AGAP, GLU, BUN, CREA, BUCR, GFRAA, GFRNA, CA, GFRAA  No results found for: TP, ALBR, TALB, ALB        Total time:   Counseling / coordination time:   > 50% counseling / coordination?:

## 2022-02-06 NOTE — PROGRESS NOTES
Problem: Pressure Injury - Risk of  Goal: *Prevention of pressure injury  Description: Document Wilner Scale and appropriate interventions in the flowsheet. Outcome: Progressing Towards Goal  Note: Pressure Injury Interventions:  Sensory Interventions: Assess changes in LOC    Moisture Interventions: Internal/External urinary devices    Activity Interventions: Pressure redistribution bed/mattress(bed type)    Mobility Interventions: Float heels,HOB 30 degrees or less,Pressure redistribution bed/mattress (bed type)    Nutrition Interventions: Document food/fluid/supplement intake    Friction and Shear Interventions: Apply protective barrier, creams and emollients                Problem: Patient Education: Go to Patient Education Activity  Goal: Patient/Family Education  Outcome: Progressing Towards Goal     Problem: Potential for Alteration in Skin Integrity  Goal: Monitor skin for areas of alteration in skin integrity  Description: Patient/family/caregiver will demonstrate ability to care for patient's skin, monitor for areas of breakdown, and demonstrate methods to prevent breakdown during hospice care. Outcome: Progressing Towards Goal     Problem: Risk for Falls  Goal: Free of falls during inpatient stay  Description: Patient will be free of falls during inpatient stay. Outcome: Progressing Towards Goal     Problem: Alteration in Mobility  Goal: Remain as independent as possible and remain safe in environment  Description: Patient will remain as independent as possible and remain safe in their environment.   Outcome: Progressing Towards Goal     Problem: Pain  Goal: Assess satisfaction of level of comfort and symptom control  Outcome: Progressing Towards Goal  Goal: *Control of acute pain  Outcome: Progressing Towards Goal     Problem: Anxiety/Agitation  Goal: Verbalize or staff assess the ability to manage anxiety  Description: The patient/family/caregiver will verbalize and demonstrate ability to manage the patient's anxiety throughout hospice care.   Outcome: Progressing Towards Goal     Problem: Infection - Risk of, Urinary Catheter-Associated Urinary Tract Infection  Goal: *Absence of infection signs and symptoms  Outcome: Progressing Towards Goal     Problem: Discharge Planning  Goal: *Participates in discharge planning  Outcome: Progressing Towards Goal

## 2022-02-06 NOTE — PROGRESS NOTES
..  0700:Report received from [employer] [facility] and [facility]. Pt is drowsy, denies pain answers appropriate yes and no questions, dtr at bedside, emotional support given. 08:52:Pt denies pain adm schedule meds no anxiety noted continue to monitor. 10:18:Pt requested tylenol for discomfort Orders received by Dr Samira Beard for tylenol SEE MAR both dtrs at bedside, very involved in care. 1100:Resting but still complain of stomach cramps offered stronger pain meds, but dtrs wanted to wait awhile. 1200:NP Kar assess pt and educated family on meds and pain management, SL dilaudid schedule to better manage pain SEE MAR.  12:19:Adm schedule dilaudid for abd pain. 12:40:Pt stated her pain was better denies chest pain. 1400:Pt sleeping with no discomfort, continue to monitor. Educated dtr on pt pain and EOL, stated she understood and just wants pain to be controlled before discharge. 1500:Pt resting comfortable wakes up and talks periodically with dtrs and take sips of fluids refusing to eat.  16:27:Adm schedule dilaudid 0.5 mg for generalized pain. 16:32:Adm albuterol, for cough tolerated well.  1700:Pt resting quietly, and engaging with dtrs at bedside. 17:59:Pt concern about bowel movement before going home offered her ducolax but refuses inform her that she will start miralax tomorrow and we will monitor. 1900:Report given to [employer]. NAME OF PATIENT:      LEVEL OF CARE: GIP  REASON FOR GIP:Pain resp distress     *PATIENT REMAINS ELIGIBLE FOR GIP LEVEL OF CARE AS EVIDENCED BY:pain despite med changes.    REASON FOR RESPITE:n/a       O2 SAFETY:  Concentrator positioning (6\" from furniture/drapes), Tanks stored in fisher , No petroleum based products on face while oxygen in use and Oxygen sign on the door     FALL INTERVENTIONS PROVIDED:   Implemented/recommended use of fall risk identification flag to all team members, Implemented/recommended assistive devices and encouraged their use, Implemented/recommended resources for alarm system (personal alarm, bed alarm, call bell, etc.)  and Implemented/recommended environmental changes (remove hazards, lower bed, improve lighting, etc.)     INTERDISPLINARY COMMUNICATION/COLLABORATION:  Physician, MSW, Jose and RN, CNA     NEW MEDICATION INITIATION DOCUMENTATION:N/A      Reason medication is being initiated:  N/A     MD / Provider name consulted re: change in status / initiation of new medication:  N/A     New Symptom(s):  N/A     New Order(s):  N/A     Name of the person notified of the changes:  N/A     Name of person being taught:  N/A     Instructions given:  N/A     Side Effects taught:  N/A     Response to teaching:  N/A        COMFORTABLE DYING MEASURE:  Is Patient/family satisfied with symptom level?  yes     DISCHARGE PLAN:MD and MSW working on discharge.

## 2022-02-06 NOTE — PROGRESS NOTES
Problem: Pressure Injury - Risk of  Goal: *Prevention of pressure injury  Description: Document Wilner Scale and appropriate interventions in the flowsheet.   Outcome: Progressing Towards Goal  Note: Pressure Injury Interventions:  Sensory Interventions: Assess changes in LOC    Moisture Interventions: Absorbent underpads    Activity Interventions: Pressure redistribution bed/mattress(bed type)    Mobility Interventions: Float heels,HOB 30 degrees or less    Nutrition Interventions: Document food/fluid/supplement intake,Offer support with meals,snacks and hydration    Friction and Shear Interventions: Apply protective barrier, creams and emollients,HOB 30 degrees or less,Lift sheet,Minimize layers                Problem: Pain  Goal: Assess satisfaction of level of comfort and symptom control  Outcome: Not Progressing Towards Goal

## 2022-02-06 NOTE — HOSPICE
1900 Received report from [id number] [address] Patient is sleeping with family at bedside. Patient has relaxed face please see flow sheet for assessment. 2005 Gave scheduled nitroglycerin ointment. , and prn dose of Dilaudid 0.5 mg IV.  2015 Patient is resting with eyes closed with relaxed face, daughter is at bedside. 2130 Patient sleeping with no facial grimacing. 2215 Patient is sleeping with a relaxed face. 2330 Patient has relaxed face tried to take  O2 cannula out because she said it was too tight. Readjusted cannula and patient was satisfied closed eyes and resting. 0015 Patient is sleeping with no facial grimacing. 0145 Gave scheduled dose of nitroglycerin ointment. Patient drank some water and repositioned. 0230 Patient is sleeping with relaxed face. 0330 Patient is sleeping with no facial grimacing, daughter is at bedside. 5411 Patient woke up repositioned in bed and gave her water because she is thirsty. Patient had no issues drinking water no aspirating or swallowing issues. 5647 Patient is sleeping in bed with relaxed face. 0700 Gave report. NAME OF PATIENT:  Alexia Cerda    LEVEL OF CARE:  [facility]    REASON FOR GIP:   Pain, despite numerous changes in medications    *PATIENT REMAINS ELIGIBLE FOR GIP LEVEL OF CARE AS EVIDENCED BY: (MUST BE ADDRESSED OF PATIENT GIP)  Patient is receiving IV medications and needs frequent nursing assessments. REASON FOR RESPITE:  NA    O2 SAFETY:  Tanks  Stored in fisher, no petroleum based products while in use.     FALL INTERVENTIONS PROVIDED:   Implemented/recommended use of fall risk identification flag to all team members, Implemented/recommended assistive devices and encouraged their use, Implemented/recommended resources for alarm system (personal alarm, bed alarm, call bell, etc.) , Implemented/recommended environmental changes (remove hazards, lower bed, improve lighting, etc.) and Implemented/recommended increased supervision/assistance    INTERDISPLINARY COMMUNICATION/COLLABORATION:  Physician and RN, CNA    NEW MEDICATION INITIATION DOCUMENTATION:  NA    Reason medication is being initiated:  NA    MD / Provider name consulted re: change in status / initiation of new medication:  NA    New Symptom(s):  NA    New Order(s):  NA    Name of the person notified of the changes:  NA    Name of person being taught:  NA    Instructions given:  NA    Side Effects taught:  NA    Response to teaching:  NA      COMFORTABLE DYING MEASURE:  Is Patient/family satisfied with symptom level?  yes    DISCHARGE PLAN:  Home

## 2022-02-07 NOTE — PROGRESS NOTES
Problem: Pressure Injury - Risk of  Goal: *Prevention of pressure injury  Description: Document Wilner Scale and appropriate interventions in the flowsheet. Outcome: Progressing Towards Goal  Note: Pressure Injury Interventions:  Sensory Interventions: Check visual cues for pain,Float heels,Keep linens dry and wrinkle-free,Maintain/enhance activity level,Minimize linen layers,Pad between skin to skin,Pressure redistribution bed/mattress (bed type),Turn and reposition approx. every two hours (pillows and wedges if needed)    Moisture Interventions: Apply protective barrier, creams and emollients,Internal/External urinary devices,Limit adult briefs,Maintain skin hydration (lotion/cream),Minimize layers,Moisture barrier    Activity Interventions: Pressure redistribution bed/mattress(bed type)    Mobility Interventions: Float heels,Pressure redistribution bed/mattress (bed type),Turn and reposition approx. every two hours(pillow and wedges)    Nutrition Interventions: Document food/fluid/supplement intake,Offer support with meals,snacks and hydration    Friction and Shear Interventions: HOB 30 degrees or less,Minimize layers                Problem: Patient Education: Go to Patient Education Activity  Goal: Patient/Family Education  Outcome: Progressing Towards Goal     Problem: Potential for Alteration in Skin Integrity  Goal: Monitor skin for areas of alteration in skin integrity  Description: Patient/family/caregiver will demonstrate ability to care for patient's skin, monitor for areas of breakdown, and demonstrate methods to prevent breakdown during hospice care. Outcome: Progressing Towards Goal     Problem: Risk for Falls  Goal: Free of falls during inpatient stay  Description: Patient will be free of falls during inpatient stay.   Outcome: Progressing Towards Goal     Problem: Alteration in Mobility  Goal: Remain as independent as possible and remain safe in environment  Description: Patient will remain as independent as possible and remain safe in their environment. Outcome: Progressing Towards Goal     Problem: Pain  Goal: Assess satisfaction of level of comfort and symptom control  Outcome: Progressing Towards Goal  Goal: *Control of acute pain  Outcome: Progressing Towards Goal     Problem: Anxiety/Agitation  Goal: Verbalize or staff assess the ability to manage anxiety  Description: The patient/family/caregiver will verbalize and demonstrate ability to manage the patient's anxiety throughout hospice care.   Outcome: Progressing Towards Goal     Problem: Infection - Risk of, Urinary Catheter-Associated Urinary Tract Infection  Goal: *Absence of infection signs and symptoms  Outcome: Progressing Towards Goal     Problem: Discharge Planning  Goal: *Participates in discharge planning  Outcome: Progressing Towards Goal

## 2022-02-07 NOTE — PROGRESS NOTES
Problem: Pressure Injury - Risk of  Goal: *Prevention of pressure injury  Description: Document Wilner Scale and appropriate interventions in the flowsheet. Outcome: Progressing Towards Goal  Note: Pressure Injury Interventions:  Sensory Interventions: Assess changes in LOC    Moisture Interventions: Absorbent underpads    Activity Interventions: Pressure redistribution bed/mattress(bed type)    Mobility Interventions: Float heels,HOB 30 degrees or less    Nutrition Interventions: Document food/fluid/supplement intake,Offer support with meals,snacks and hydration    Friction and Shear Interventions: Apply protective barrier, creams and emollients,HOB 30 degrees or less,Lift sheet,Minimize layers                Problem: Potential for Alteration in Skin Integrity  Goal: Monitor skin for areas of alteration in skin integrity  Description: Patient/family/caregiver will demonstrate ability to care for patient's skin, monitor for areas of breakdown, and demonstrate methods to prevent breakdown during hospice care. Outcome: Progressing Towards Goal     Problem: Risk for Falls  Goal: Free of falls during inpatient stay  Description: Patient will be free of falls during inpatient stay. Outcome: Progressing Towards Goal     Problem: Alteration in Mobility  Goal: Remain as independent as possible and remain safe in environment  Description: Patient will remain as independent as possible and remain safe in their environment.   Outcome: Progressing Towards Goal     Problem: Pain  Goal: Assess satisfaction of level of comfort and symptom control  Outcome: Progressing Towards Goal  Goal: *Control of acute pain  Outcome: Progressing Towards Goal

## 2022-02-07 NOTE — HOSPICE
1900:  Received report from Geovany pagan LPN  6687:  Assessed pt. Daughters at bedside. C/O some mild aching in abd to mid chest.  Has an occasional cough and some belching. Was able to move self in bed. 2003:  Nitroglycerin patch with 2 inch of medication applied to upper abdomen. 2036 Daughters leaving. Paid caregiver Karlie Farnsworth staying the night. 2041:  Scheduled Dilaudid given SL. Reports mouth hurts when swallowing. Roof of mouth swollen and ulcerated. Using ice helps with pain. 2200:  Awake. Neutral facial expression. Denies pain. C/O some coughing. Elevated HOB. 2300:  Resting quietly with eyes closed. Resp even and unlabored. Neutral facial expression. 0008:  Resting quietly with eyes closed. Resp even and unlabored. Neutral facial expression. 6333:  Pt coughing. Fidgeting. Had blankets off. Scheduled Dilaudid given. Stated abd \" Hurt a lot\"  Pain exacerbated by coughing. Gave pillow to splint abd while coughing. Continued to fidget moving around in bed. 0059:  PRN dose of Valium 2 mg given. Helped pt reposition. Put cup underneath chin when trying to drink. Redirected on use of cup.  0202:  Awakened to place Nitroglycerin patch. Stated she felt better. 0300:  Resting quietly with eyes closed. Resp even and unlabored. Neutral facial expression. 0400:  Continues to rest quietly with eyes closed. Resp even and unlabored. Neutral facial expression. 0503:  Pt had removed gown. Scheduled Dilaudid 0.5 mg given SL. Stated abdomen ''Hurt a lot. \"   Helped NA give pt bath. Moaned, held abdomen and grimaced when turned. 0615:  Stated she was starting to feel a little better.   Smiling.    0700:  Report given to Yuridia Colbert RN  NAME OF PATIENT:  Jane Rudolph    LEVEL OF CARE:  GIP    REASON FOR GIP:   Pain, despite numerous changes in medications and Medication adjustment that must be monitored 24/7    *PATIENT REMAINS ELIGIBLE FOR GIP LEVEL OF CARE AS EVIDENCED BY: Pt requiring medication adjustments and frequent SN assessment of symptoms. REASON FOR RESPITE:  N/A    O2 SAFETY:  Concentrator positioning (6\" from furniture/drapes) and Oxygen sign on the door    FALL INTERVENTIONS PROVIDED:   Implemented/recommended resources for alarm system (personal alarm, bed alarm, call bell, etc.)     INTERDISPLINARY COMMUNICATION/COLLABORATION:  Physician, MSW, [place] and RN, CNA    NEW MEDICATION INITIATION DOCUMENTATION:  N/A    Reason medication is being initiated:  N/A    MD / Provider name consulted re: change in status / initiation of new medication:  N/A    New Symptom(s):  N/A    New Order(s): N/A    Name of the person notified of the changes: N/A    Name of person being taught: N/A    Instructions given:  N/A    Side Effects taught:  N/A    Response to teaching:  N/A      COMFORTABLE DYING MEASURE:  Is Patient/family satisfied with symptom level?  yes    DISCHARGE PLAN:  Home when symptoms managed.

## 2022-02-07 NOTE — PROGRESS NOTES
[facility]   Good Help to Those in Need  [phone]    Patient Name: Edda Cerda  YOB: 1935    Date of Provider Hospice Visit: 22    Level of Care:   [x] General Inpatient (GIP)    [] Routine   [] Respite    Current Location of Care:  [] [facility] [] [facility] [] [facility] [] [facility] [x] Hospice [facility], patient referred from:  [] [facility] [] [facility] [] [facility] [] [facility] [x] Home [] Other:     Date of Original Hospice Admission: 2022  Hospice Medical Director at time of admission: Cherrie Delcid MD    Principle Hospice Diagnosis: Atherosclerotic heart disease of native coronary artery, unstable angina pectoralis  Diagnoses RELATED to the terminal prognosis: Non-ST elevation myocardial infarction, chronic obstructive pulmonary disease, 20-pack-year smoking history (half a pack for 40 years, quit 20 years ago), coronary artery disease, chronic kidney disease, peripheral vascular disease,   Other Diagnoses: Anemia, diverticulitis, fem-fem bypass, pmhx perforation of intestine with surgical intervention, obstructive sleep apnea, pmhx non-hodgkin lymphoma, presence of urogenital implants       HOSPICE SUMMARY   Do not cut and paste chart information other than imaging findings    Edda Cerda is a 80y.o. year old who was admitted to [facility]. The patient's principle diagnosis has resulted in Chest pain that was not able to be controlled in the home  Refer to LCD     Functionally, the patient's Karnofsky and/or Palliative Performance Scale has declined over a period of weeks and is estimated at 30. The patient is dependent on all ADLs:    Objective information that support this patients limited prognosis includes:  The patient's disease has continued to progress AEB periods of debility/bedbound status, disorientation, increased dyspnea/oxygen dependence, frequent episodes of chest pain unrelieved with NTG paste and requiring PRN doses of opioids, generalized pain    The patient/family chose comfort measures with the support of Hospice. HOSPICE DIAGNOSES   Active Symptoms:  1. Chest pain  2. Abdominal Pain  3. Fatigue  4. Nausea  5. Opioid induced constipation  6. Ulcerated oral lesion  7. Hospice care     PLAN     1. Met with Ms Jesusita Estrada and her two daughters: Barbara Ansari who were at bedside. Ms Jesusita Estrada is doing somewhat better today & eager to go home. She has had 2 BM today after receiving miralax and prune juice x 2. She had episode of severe right sided abdominal pain 7/10 earlier today and had to receive SQ dose of prn dilaudid that seemed to have helped pain. She is on scheduled dose of sublingual dilaudid 0.5mg every 4 hours that she is tolerating well however uncertain if enough. 2.  I talked at length with the daughters and we agreed on the following plan: we will keep here here at [facility] for atleast another day and reassess in rounds tomorrow to see condition and if stable then will arrange to go home. 3. Meanwhile we will schedule dilaudid at 1mg dose sublingual every 4 hours and 2mg sublingual every hour as needed. Assess pain control. 4. Continue Miralax for constipation and use prune juice as needed. 5. Zofran tablet ODT as needed for nausea. 6. Pt has a small erythematous somewhat ulcerated oral lesion on superior palate that is painful; will use magic mouthwash with swabs for oral care 4 times a day. 7.  and SW to support family needs  8. Disposition: home, possibly tomorrow  9. Hospice Plan of care was reviewed in detail and agree with current plan of care    Prognosis estimated based on 02/07/22 clinical assessment is:   [] Hours to Days    [] Days to Weeks    [x] Other: 6 months or less    Communicated plan of care with: Hospice Case Manager;  Hospice IDT; Care Team     GOALS OF CARE     Patient/Medical POA stated Goal of Care: pain control, comfort, and the ability to do things she wants to do    [x] I have reviewed and/or updated ACP information in the Advance Care Planning Navigator. This information is available in the [address] link in the patient's chart header. Primary Decision Maker (Health Care Agent):   Primary Decision MakerElmira Perez - [phone]  Along with her sister who lives in [place]    Resuscitation Status: DNR  If DNR is there a Durable DNR on file? : [x] Yes [] No (If no, complete Durable DNR)    HISTORY     History obtained from: family, chart  CHIEF COMPLAINT: General discomfort and belly pain when she rolls  The patient is:   [x] Verbal  [] Nonverbal  [] Unresponsive    HPI/SUBJECTIVE:  Ms Laxmi Villarreal had pizza Friday night which she thinks triggered her extreme chest pain, followed by a particularly hard bowel movement which made it even worse. Through the night, she was never able to get good pain control with her oral hydromorphone, so the decision was made Saturday to bring her to the hospice house to see if IV or SQ would work better    2/7; pt is sleepy and lethargic but well arousable and responds appropriately to simple questions; says she has no pain at this time.         REVIEW OF SYSTEMS     The following systems were: [x] reviewed  [] unable to be reviewed    Positive ROS include:  Constitutional: fatigue, weakness, in pain  Ears/nose/mouth/throat:   Respiratory  Gastrointestinal:poor appetite, abdominal pain, constipation  Musculoskeletal:  Neurologic:weakness    Adult Non-Verbal Pain Assessment Score: 0    Face  [x] 0   No particular expression or smile  [] 1   Occasional grimace, tearing, frowning, wrinkled forehead  [] 2   Frequent grimace, tearing, frowning, wrinkled forehead    Activity (movement)  [x] 0   Lying quietly, normal position  [] 1   Seeking attention through movement or slow, cautious movement  [] 2   Restless, excessive activity and/or withdrawal reflexes    Guarding  [] 0   Lying quietly, no positioning of hands over areas of body  [x] 1   Splinting areas of the body, tense  [] 2 Rigid, stiff    Physiology (vital signs)  [x] 0   Stable vital signs  [] 1   Change in any of the following: SBP > 20mm Hg; HR > 20/minute  [] 2   Change in any of the following: SBP > 30mm Hg; HR > 25/minute    Respiratory  [x] 0   Baseline RR/SpO2, compliant with ventilator  [] 1   RR > 10 above baseline, or 5% drop SpO2, mild asynchrony with ventilator  [] 2   RR > 20 above baseline, or 10% drop SpO2, asynchrony with ventilator     FUNCTIONAL ASSESSMENT     Palliative Performance Scale (PPS): 30%       PSYCHOSOCIAL/SPIRITUAL ASSESSMENT     Active Problems:    * No active hospital problems. *    No past medical history on file. No past surgical history on file. Social History     Tobacco Use    Smoking status: Never Smoker    Smokeless tobacco: Never Used   Substance Use Topics    Alcohol use: Not Currently     No family history on file.    Allergies   Allergen Reactions    Ace Inhibitors Cough    Pantoprazole Nausea Only    Sulfamethoxazole-Trimethoprim Hives    Atorvastatin Myalgia    Erythromycin Base Nausea Only    Levofloxacin Other (comments)    Lorazepam Other (comments)     Paradoxical reaction    Pravastatin Myalgia    Seafood Other (comments)    Tramadol Other (comments)    Cefuroxime Hives    Methenamine Nausea Only    Penicillins Hives and Rash     hives  hives      Propoxyphene Other (comments)     Doesn't recall  tolerates norcoaltered mental status  tolerates norcoaltered mental status        Current Facility-Administered Medications   Medication Dose Route Frequency    HYDROmorphone (DILAUDID) 1 mg/mL oral solution 1 mg  1 mg Oral Q4H    HYDROmorphone (DILAUDID) 1 mg/mL oral solution 2 mg  2 mg SubLINGual Q1H PRN    magic mouthwash (FIRST-MOUTHWASH BLM) oral suspension 5 mL (Patient Supplied)  5 mL Oral QID    acetaminophen (TYLENOL) tablet 650 mg  650 mg Oral Q4H PRN    polyethylene glycol (MIRALAX) packet 17 g  17 g Oral DAILY    bisacodyL (DULCOLAX) suppository 10 mg 10 mg Rectal DAILY PRN    haloperidol (HALDOL) 2 mg/mL oral solution 2 mg  2 mg SubLINGual Q6H PRN    acetaminophen (TYLENOL) suppository 650 mg  650 mg Rectal Q4H PRN    hyoscyamine SL (LEVSIN/SL) tablet 0.125 mg  0.125 mg SubLINGual Q4H PRN    diazePAM (VALIUM) tablet 2 mg  2 mg Oral Q6H PRN    prochlorperazine (COMPAZINE) tablet 10 mg  10 mg Oral Q6H PRN    albuterol-ipratropium (DUO-NEB) 2.5 MG-0.5 MG/3 ML (Patient Supplied)  3 mL Nebulization Q4H PRN    nitroglycerin (NITROBID) 2 % ointment 2 Inch (Patient Supplied)  2 Inch Topical Q6H        PHYSICAL EXAM     Wt Readings from Last 3 Encounters:   12/02/21 74.4 kg (164 lb)       Visit Vitals  /64   Pulse 88   Temp 98.6 °F (37 °C)   Resp 16   SpO2 98%       Supplemental O2  [] Yes  [] NO  Last bowel movement: 2/4/22    Currently this patient has:  [] Peripheral IV [] PICC  [] PORT [] ICD    [] Bowling Catheter [] NG Tube   [] PEG Tube    [] Rectal Tube [] Drain  [] Other:     Constitutional: resting comfortably, lethargic, pale, drowsy  Eyes: normal  ENMT: erythematous ulcerated lesion on superior palate in oral cavity, painful to touch  Cardiovascular:   Respiratory: not labored  Gastrointestinal: not tender on palpation, but tender when she rolls over  Musculoskeletal: no deformities  Skin: intact, warm  Neurologic: very tired  Psychiatric: normal  Other:       Pertinent Lab and or Imaging Tests:  No results found for: NA, K, CL, CO2, AGAP, GLU, BUN, CREA, BUCR, GFRAA, GFRNA, CA, GFRAA  No results found for: TP, ALBR, TALB, ALB        Total time: 35mts  Counseling / coordination time:   > 50% counseling / coordination?:

## 2022-02-07 NOTE — PROGRESS NOTES
0700: report received from MANJIT Garcia [address]: patient resting quietly, opens eyes to my voice. Able to answer basic questions. Oriented to self and place at this time. Full assessment done. Pt states she is having sharp pain in her abdomen. Tender to palpation. Family friend at the bedside, stated patient continues to have complaints about roof of her mouth. RN assured her that it would be discussed with the MD today. 2912: miralax given mixed with prune juice and nitro paste applied to R arm and old paste removed from L arm. Pt is awake and alert, holding hand on abdomen, up and down the R side. RN offered patient a dose of tylenol and she stated yes, that it would probably help her mouth too. RN examined inside mouth, large reddened circular area on roof of mouth with a smaller ulcerated area in the middle.   0801: PRN dose of tylenol given per [facility]. Pt is adjusting herself in bed, trying to get comfortable. RN offered to fully turn her and she declined at this time. 7896: pt resting quietly with eyes closed, neutral facial expression. Pt aroused when RN said her name. Scheduled dose of dilaudid given per [facility]. Pt drank some water, said Thank you and then closed her eyes again. 1000: pt resting quietly, neutral facial expression. Daughters to bedside. RN updated daughters on patient status and morning thus far. Discussed with them MD morning rounds to happen in a little bit. Patient states her pain in her abdomen is still present about 6/10. Daughter expressed concern about patient's belching/hiccupping, states that she was belching frequently when she had her heart attack previously. 1034: daughter called for RN, stated patient was now c/o pain in her chest and still the pain in her abdomen. Discussed PRN IV dilaudid and patient and daughter in agreement. PRN dose given per MAR. Will continue to monitor. 1120: pt resting quietly, opens eyes and wakes when I enter room. Very confused and drowsy.  RN reminded her that she had had a dose of dilaudid. Pt stated her pain was feeling better. 1130: daughter to nurses station, reports patient has had a BM. Nurse and CNA to bedside to change patient. Medium amount of liquid stool. Pericare and arevalo care done. Pt had more liquid stool come out while being cleaned. New brief placed on her and will recheck in a little bit. 1230: rounds with Dr Kunal Tariq. Pt drowsy and confused but able to answer some basic questions. Pt states her pain is feeling a lot better after IV dose of dilaudid. In discussion with daughters, plan for MD to increase scheduled dilaudid to 1mg and order 2mg PO dilaudid q1 hour PRN. Daughters in agreement and verbalized understanding. 1330: pt states she is done having her BM. RN and CNA to bedside to change her brief. Pt only had a small amount of liquid stool in diaper but states she feels like she had a blowout and feels a lot better. 1400: scheduled PO dilaudid given per [facility] and applied new dose of nitro paste and removed old dose. Pt alert but drowsy. Reports that her pain is much improved. 1515: pt is resting quietly with eyes closed, arouses when spoken to. Daughters report that she has been sleeping comfortably. Daughter reports she has been passing gas. 1600: pt resting quietly with eyes closed, respirations even and unlabored. 1700: pt resting quietly with eyes closed, opens eyes to my voice. States NO when asked if she is currently having any pain. 1800: pt noted to be shifting in bed, trying to get comfortable. Scheduled dose of dilaudid given. RN provided education to patient on pain management and importance of admitting when she has pain so we can make sure to give her the meds that she needs and to know what she will need to go home. Brief checked, clean at this time. 1900: report given to oncoming nurse.       NAME OF PATIENT:  Zia Buchanan    LEVEL OF CARE:  GIP    REASON FOR GIP:   Medication adjustment that must be monitored 24/7 and Stabilizing treatment that cannot take place at home    *PATIENT REMAINS ELIGIBLE FOR GIP LEVEL OF CARE AS EVIDENCED BY: (MUST BE ADDRESSED OF PATIENT GIP)  Pt is requiring frequent nursing assessments and changes in medications to manage symptoms.      REASON FOR RESPITE:  n/a    O2 SAFETY:  Concentrator positioning (6\" from furniture/drapes), Tanks stored in fisher , No petroleum based products on face while oxygen in use and Oxygen sign on the door    FALL INTERVENTIONS PROVIDED:   Implemented/recommended use of non-skid footwear, Implemented/recommended use of fall risk identification flag to all team members, Implemented/recommended assistive devices and encouraged their use, Implemented/recommended resources for alarm system (personal alarm, bed alarm, call bell, etc.) , Implemented/recommended environmental changes (remove hazards, lower bed, improve lighting, etc.) and Implemented/recommended increased supervision/assistance    INTERDISPLINARY COMMUNICATION/COLLABORATION:  Physician, MSW, [place] and RN, CNA    NEW MEDICATION INITIATION DOCUMENTATION:  Consulted AT MD to report change in pt status, Obtained Order from Provider for initiation of symptom relief medication /other medication needed and Documentation completed in Clinical Note in [facility]    Reason medication is being initiated:  Continued pain    MD / Provider name consulted re: change in status / initiation of new medication:  All    New Symptom(s):  pain    New Order(s):  1mg PO dilaudid q4 hours and 2mg PO dilaudid q1 hour PRN    Name of the person notified of the changes:  Ketty Lema    Name of person being taught:  Ketty Lema    Instructions given:  yes    Side Effects taught:  yes    Response to teaching:  Verbalized understanding      COMFORTABLE DYING MEASURE:  Is Patient/family satisfied with symptom level?  yes    DISCHARGE PLAN:  Return home once symptoms are managed on appropriate dose of dilaudid and continue to be followed by home hospice.

## 2022-02-07 NOTE — HOSPICE
LMSW met with patient briefly, offering supportive presence. Patient's daughter Jazmín Jenkins and daughter Valeria Durant asked to speak with LMSW in waiting area. Lian also had patient's son-in-law Read Otto on the phone. Family expressed anxieties regarding prognosis and disposition. LMSW discussed different LOCs with hospice and provided reassurance of hospice support. Daughter Valeria Durant stated that she is needing to head back to [place] soon and would like to discuss prognosis to inform her decision. LMSW encouraged Valeria Durant to speak with MD regarding prognosis during rounds. LMSW discussed availability of bereavement support and  services. LMSW will continue to follow up with needs that arise.      Time In: 11:06  Time Out: 11:33    FAY Cody, [address] [facility]   [phone]

## 2022-02-08 NOTE — [facility] IDG MASTER NOTE
[facility]  Date: 02/08/22  Time: 11:49 AM    ___________________    Patient: Tete Retana  Coverage Information:     [place]or: [place]     Plan: [place] MED[place] PART A & B     Subscriber ID: [id number]     Phone Number:   MRN: [id number]  CCN:   [place] Claim No. :     Hospice Election Date:   Current Benefit Period: Benefit Period 1  Start Date: 1/13/2022  End Date: 4/12/2022      Medical Director:   Hospice Attending Provider: Gunjan Witt [address] [place] [address]  Phone: [phone]  Fax: [phone]    Level of Care: General Inpatient Care      ___________________    Diagnoses:  Diagnoses of Atherosclerosis of native coronary artery of native heart with angina pectoris [facility]), Hospice care, Chest pain, unspecified type, Abdominal pain, unspecified abdominal location, Fatigue, unspecified type, Drug-induced constipation, Oral lesion, and Nausea were pertinent to this visit.     Current Medications:    Current Facility-Administered Medications:     HYDROmorphone (DILAUDID) 1 mg/mL oral solution 1 mg, 1 mg, Oral, Q4H, Pamela Carrizales MD, 1 mg at 02/08/22 1050    HYDROmorphone (DILAUDID) 1 mg/mL oral solution 2 mg, 2 mg, SubLINGual, Q1H PRN, Pamela Carrizales MD    magic mouthwash (FIRST-MOUTHWASH BLM) oral suspension 5 mL (Patient Supplied), 5 mL, Oral, QID, Pamela Carrizales MD, 5 mL at 02/08/22 0846    ondansetron (ZOFRAN ODT) tablet 4 mg, 4 mg, Oral, Q6H PRN, Pamela Carrizales MD    acetaminophen (TYLENOL) tablet 650 mg, 650 mg, Oral, Q4H PRN, Timoteo Campa MD, 650 mg at 02/07/22 0801    polyethylene glycol (MIRALAX) packet 17 g, 17 g, Oral, DAILY, Basia Lakhani NP, 17 g at 02/08/22 0847    bisacodyL (DULCOLAX) suppository 10 mg, 10 mg, Rectal, DAILY PRN, Basia Lakhani, NP    haloperidol (HALDOL) 2 mg/mL oral solution 2 mg, 2 mg, SubLINGual, Q6H PRN, Basia Lakhani, NP    acetaminophen (TYLENOL) suppository 650 mg, 650 mg, Rectal, Q4H PRN, Basia Lakhani, NP    hyoscyamine SL (LEVSIN/SL) tablet 0.125 mg, 0.125 mg, SubLINGual, Q4H PRN, Basia Lakhani, NP    diazePAM (VALIUM) tablet 2 mg, 2 mg, Oral, Q6H PRN, Pajic, Basia G, NP, 2 mg at 02/08/22 5910    prochlorperazine (COMPAZINE) tablet 10 mg, 10 mg, Oral, Q6H PRN, Basia Lakhani, NP    albuterol-ipratropium (DUO-NEB) 2.5 MG-0.5 MG/3 ML (Patient Supplied), 3 mL, Nebulization, Q4H PRN, Basia Lakhani, NP, 3 mL at 02/06/22 1632    nitroglycerin (NITROBID) 2 % ointment 2 Inch (Patient Supplied), 2 Inch, Topical, Q6H, Basia Lakhani, NP, 2 Inch at 02/08/22 0345    Orders:  Orders Placed This Encounter    ADULT DIET Regular     Standing Status:   Standing     Number of Occurrences:   1     Order Specific Question:   Primary Diet:     Answer:   Regular    NURSING-MISCELLANEOUS: admit to GIP LOC GIP--admit to GIP level of care: Skilled nurse-daily x14 days with 5 PRN visits for symptom control. MSW- 1x weekly with 5 visits PRN family support and need for volunteer services. -- 1x weekly with. .. GIP--admit to GIP level of care: Skilled nurse-daily x14 days with 5 PRN visits for symptom control. MSW- 1x weekly with 5 visits PRN family support and need for volunteer services. -- 1x weekly with 5 visits PRN spiritual support. CNA-- daily x14 days. Standing Status:   Standing     Number of Occurrences:   1     Order Specific Question:   Description of Order:     Answer:   admit to GIP LOC    NURSING-MISCELLANEOUS: (see comments) 1. NO admission labs, x-rays or other diagnostic tests, unless pertinent to symptom control . 2. Discontinue ALL prior medications. CONTINUOUS     1. NO admission labs, x-rays or other diagnostic tests, unless pertinent to symptom control . 2. Discontinue ALL prior medications.      Standing Status:   Standing     Number of Occurrences:   1     Order Specific Question:   Description of Order:     Answer:   (see comments)    COMFORT MEASURES ONLY     Standing Status:   Standing     Number of Occurrences:   1    VITAL SIGNS     Standing Status:   Standing     Number of Occurrences:   [id number]    NOTIFY PROVIDER: SPECIFY NOTIFY PROVIDER: FOR PAIN, DYSPNEA, AGITATION, OTHER DISTRESS OR NOT RESPONDING TO ORDERED INTERVENTIONS CONTINUOUS Routine     Standing Status:   Standing     Number of Occurrences:   1     Order Specific Question:   Please describe the test or procedure you would like to order. Answer:   NOTIFY PROVIDER: FOR PAIN, DYSPNEA, AGITATION, OTHER DISTRESS OR NOT RESPONDING TO ORDERED INTERVENTIONS    ORAL CARE     Keep mouth moisturized with sponge sticks/toothettes and tap water. Vaseline to lips and nares as needed. Standing Status:   Standing     Number of Occurrences:   1    BEDREST, COMPLETE     Standing Status:   Standing     Number of Occurrences:   1    TURN & POSITION     TURN & POSITION EVERY 6 HOURS - PATIENT MAY REFUSE     Standing Status:   Standing     Number of Occurrences:   1    DO NOT RESUSCITATE     Standing Status:   Standing     Number of Occurrences:   1    OXYGEN CANNULA Liters per minute: 2; Indications for O2 therapy: OTHER PRN Routine     Oxygen as needed. Adjust for comfort. Discontinue if not contributing to patient comfort. Standing Status:   Standing     Number of Occurrences:   [id number]     Order Specific Question:   Liters per minute:      Answer:   2     Order Specific Question:   Indications for O2 therapy     Answer:   OTHER    FALL PRECAUTIONS     Standing Status:   Standing     Number of Occurrences:   1    bisacodyL (DULCOLAX) suppository 10 mg    haloperidol (HALDOL) 2 mg/mL oral solution 2 mg    acetaminophen (TYLENOL) suppository 650 mg    hyoscyamine SL (LEVSIN/SL) tablet 0.125 mg    DISCONTD: HYDROmorphone (DILAUDID) 1 mg/mL oral solution 1 mg    diazePAM (VALIUM) tablet 2 mg    prochlorperazine (COMPAZINE) tablet 10 mg    DISCONTD: HYDROmorphone (DILAUDID) injection 0.5 mg    albuterol-ipratropium (DUO-NEB) 2.5 MG-0.5 MG/3 ML (Patient Supplied)     Order Specific Question:   MODE OF DELIVERY     Answer:   Nebulizer     Order Specific Question:   Initiate RT Bronchodilator Protocol     Answer:   No    DISCONTD: senna-docusate (PERICOLACE) 8.6-50 mg per tablet 1 Tablet (Patient Supplied)    nitroglycerin (NITROBID) 2 % ointment 2 Inch (Patient Supplied)     Order Specific Question:   Specific disease being treated with this drug. Answer:   chest pain     Order Specific Question:   Is this requested medication unique in class, structure or indication     Answer:   Yes     Order Specific Question:   Reasons for patient to receive nonformulary medication     Answer:   patient supplied med    acetaminophen (TYLENOL) tablet 650 mg    DISCONTD: HYDROmorphone (DILAUDID) 1 mg/mL oral solution 0.5 mg    polyethylene glycol (MIRALAX) packet 17 g    HYDROmorphone (DILAUDID) 1 mg/mL oral solution 1 mg    HYDROmorphone (DILAUDID) 1 mg/mL oral solution 2 mg    magic mouthwash (FIRST-MOUTHWASH BLM) oral suspension 5 mL (Patient Supplied)    ondansetron (ZOFRAN ODT) tablet 4 mg    INITIAL PHYSICIAN ORDER: HOSPICE Level Of Care: General Inpatient; Reason for Admission: heart disease     Standing Status:   Standing     Number of Occurrences:   1     Order Specific Question:   Status     Answer:   Hospice     Order Specific Question:   Level Of Care     Answer:   General Inpatient     Order Specific Question:   Reason for Admission     Answer:   heart disease     Order Specific Question:   Admitting Physician     Answer:   Phyliss      Order Specific Question:   Attending Physician     Answer:   Suhail Duong [[id number]]     Order Specific Question:   Discharge Plan:     Answer:   Home with Home Hospice       Allergies:   Allergies   Allergen Reactions    Ace Inhibitors Cough    Pantoprazole Nausea Only    Sulfamethoxazole-Trimethoprim Hives    Atorvastatin Myalgia    Erythromycin Base Nausea Only    Levofloxacin Other (comments)    Lorazepam Other (comments)     Paradoxical reaction    Pravastatin Myalgia    Seafood Other (comments)    Tramadol Other (comments)    Cefuroxime Hives    Methenamine Nausea Only    Penicillins Hives and Rash     hives  hives      Propoxyphene Other (comments)     Doesn't recall  tolerates norcoaltered mental status  tolerates norcoaltered mental status         Care Plan:  1/31/22 Problems (Active)     Problem: Administration of Oxygen Therapy     Dates: Start: 01/31/22       Description: Lack of knowledge in how to administer oxygen and how to care for the equipment    Disciplines: Interdisciplinary    Goal: *Adequate oxygenation     Dates: Start: 01/31/22       Description: Patient/caregiver will verbalize understanding of oxygen therapy throughout the episode of care. Disciplines: Interdisciplinary    Intervention: Instruct on oxygen therapy     Dates: Start: 01/31/22       Description: Instruct patient/caregiver in use of home oxygen therapy to be administered at 4 Liters/min via nasal cannula continuous. Instruct on cleaning and replacement of tubing. Instruct on precautions to avoid smoking, static electricity, oil based lubricants, provide signage to be posted on patient's door and proper storage use and management of portable tanks. Problem: Aide activity     Dates: Start: 01/31/22       Description: Aide to assist patient activity tasks. Disciplines: Interdisciplinary    Goal: Patient will have personal care needs met     Dates: Start: 01/31/22       Description: Patient will have personal care needs met    Disciplines: Interdisciplinary    Intervention: Aide assist with repositioning     Dates: Start: 01/31/22       Description: Assist with repositioning patient. FALL PRECAUTIONS:  Patient is a HIGH fall risk.                Problem: Aide elimination     Dates: Start: 01/31/22 Description: Aide assist with bathroom elimination    Disciplines: Interdisciplinary    Goal: Patient will have personal care needs met     Dates: Start: 01/31/22       Description: Patient will have personal care needs met. Disciplines: Interdisciplinary    Intervention: Aide to assist with patient elimination     Dates: Start: 01/31/22       Description: Assist patient with bathroom elimination by changing soiled brief. Intervention: Aide to cleanse perineal area     Dates: Start: 01/31/22       Description: Cleanse perineal area report any observed or patient reported concerns to RN       Intervention: Aide to provide catheter care     Dates: Start: 01/31/22       Description: Cleanse skin and catheter tubing with soap and water. Empty bag and measure and record output. Observe urine and patient, record observations, and report any observed or patient reported concerns to the RN. Problem: Aide personal care     Dates: Start: 01/31/22       Description: Aide to assist patient with personal care    Disciplines: Interdisciplinary    Goal: Patient will have personal care needs met     Dates: Start: 01/31/22       Description: Patient's personal care needs to be met by discharge    Disciplines: Interdisciplinary    Intervention: Aide perform skin care     Dates: Start: 01/31/22       Description: Apply barrier cream to sacrum and lotions to skin. Aide to report to clinician any areas of skin breakdown. Intervention: Aide assist with oral care     Dates: Start: 01/31/22       Description: Assist patient with oral care: setup and assist with oral care. Intervention: Aide perform fingernail filing     Dates: Start: 01/31/22       Description: Perform fingernail filing. Intervention: Aide assist with dressing     Dates: Start: 01/31/22       Description: Assist patient with dressing upper and lower body.        Intervention: Aide assist with foot care     Dates: Start: 01/31/22 Description: Assist patient with foot care. apply lotions and socks. Intervention: Aide assist with shampoo     Dates: Start: 01/31/22       Description: Assist patient with shampooing hair in bed. Intervention: Aide assist with bath     Dates: Start: 01/31/22       Description: Assistance with bed bath. Acceptable bath location(s): bed. Clean immediate area after bath. Problem: Aide to report findings     Dates: Start: 01/31/22       Disciplines: Interdisciplinary    Goal: Health maintenance     Dates: Start: 01/31/22       Disciplines: Interdisciplinary    Intervention: Aide report findings     Dates: Start: 01/31/22       Description: [address] to Report: any patient/caregiver report of no bowel movement in 3 or more days, open skin areas and abnormal pain. Oxygen in use  Continuous  Do not remove         Intervention: Aide participate in supervisory visit     Dates: Start: 01/31/22       Description: Vasu Concepcion to participate in supervisory visit. Problem: Anxiety/Agitation     Dates: Start: 01/31/22       Description: Anxiety/Agitation    Disciplines: Interdisciplinary    Goal: Verbalize or staff assess the ability to manage anxiety     Dates: Start: 01/31/22       Description: The patient/caregiver/family will verbalize and demonstrate ability to manage the patient's anxiety throughout hospice care.     Disciplines: Interdisciplinary    Intervention: Instructon strategies to reduce anxiety/agitation     Dates: Start: 01/31/22       Description: Instruct patient/caregiver on strategies to reduce anxiety/agitation       Intervention: Assess for anxiety/agitation     Dates: Start: 01/31/22       Description: Assess for signs and symptoms of anxiety/agitation             Problem: Home Health Aide Supervisory Visit     Dates: Start: 01/31/22       Description: Supervision of  Aide    Disciplines: Interdisciplinary    Goal: Health maintenance     Dates: Start: 01/31/22 Description: RNCM to perform supervisory visit HHA by direct or indirect observation every 14 days. Disciplines: Interdisciplinary    Intervention: Supervisory visit of [address] every 14 days     Dates: Start: 01/31/22       Description: Perform supervisory visit of the home health aide at  least every 14 days             Problem: Home Safety     Dates: Start: 01/31/22       Description: Home Safety at End of Life    Disciplines: Interdisciplinary    Goal: Verbalize understanding of home safety measures; patient remains safe at home     Dates: Start: 01/31/22       Description: Carrillo Garcia will verbalize understanding of home safety measures, and patient will remain safe at home. Disciplines: Interdisciplinary    Intervention: Instruct on proper use of home oxygen     Dates: Start: 01/31/22       Description: Instruct patient/caregiver on proper use of home oxygen  Oxygen education provided as follows:      -Causes of fire: YES    -Do not smoke near oxygen: YES    -Obtain smoke detector: NO: functional fire alarm    -Change smoke detector battery semiannually (with daylight saving time change): YES    -Proper storage of oxygen: YES    -Fire risks for neighboring residences and buildings: YES             Problem: Infection Prevention - Catheter     Dates: Start: 01/31/22       Description: Knowledge deficit of Infection prevention related to indwelling catheter    Disciplines: Interdisciplinary    Goal: Knowledge - Infection Control     Dates: Start: 01/31/22       Description: Patient/caregiver will verbalize methods of UTI prevention and signs/symptoms of UTI during hospice episode.     Disciplines: Interdisciplinary    Intervention: Instruct infection prevention     Dates: Start: 01/31/22       Description: Instruct patient/caregiver in strategies to prevent urinary tract infection including clean around catheter site daily, clean hands before and after touching catheter, positioning the drainage bag to keep below the level of the patient's bladder, and increase fluid intake. Instruct patient/caregiver on how to recognize signs and symptoms of infection: including elevated temperature, changes in mental status or confusion, pain, foul smelling urine, urine that has changed in color or clarity; and when to notify Home Care agency and/or physician. Problem: Knowledge Deficit - Coronary Artery Disease     Dates: Start: 01/31/22       Description: Knowledge deficit - coronary artery disease    Disciplines: Interdisciplinary    Goal: Patient/Caregiver will demonstrate knowledge of coronary artery disease     Dates: Start: 01/31/22       Description: Patient/caregiver will demonstrate knowledge of coronary artery disease as evidenced by the ability to verbalize and teach back coronary artery disease process, and when to notify hospice/provider during hospice episode. Disciplines: Interdisciplinary    Intervention: Instruct on coronary artery disease notifications     Dates: Start: 01/31/22       Description: Instruct patient/caregiver to notify home health/provider for: Chest pain that is more frequent, or chest pain at rest; questions or concerns about condition or care. Notify emergency services for signs of a heart attack: Squeezing, pressure, or pain in chest; discomfort or pain in back, neck, jaw, stomach, or arm; shortness of breath; nausea or vomiting; lightheadedness or a sudden cold sweat. Notify emergency services for signs of a stroke: Numbness or drooping on one side of face, weakness in an arm or leg, confusion or difficulty speaking, dizziniess, severe headache, or vision loss. Intervention: Instruct on coronary artery disease process     Dates: Start: 01/31/22       Description: Instruct patient/caregiver that coronary artery disease (CAD) is narrowing of the arteries of the heart caused by a buildup of plaque. Plaque is made up of cholesterol and other substances.  As the arteries narrow from filling with plaques, the amount of blood that can flow to your heart decreases, which in turn causes the heart to get less oxygen. This lack of oxygen causes the main symptom of CAD: chest pain (also known as angina). This pain may feel like burning, squeezing, or crushing tightness in the chest and may spread to the neck, jaw, shoulder blade, or arm. Other symptoms may include nausea, vomiting, sweating, fainting, or cold feeling in the extremities. Problem: Knowledge deficit on COVID-19     Dates: Start: 22       Description: Lack of knowledge of management of COVID-19    Disciplines: Interdisciplinary    Goal: Knowledge COVID-19 Management     Dates: Start: 22       Description: Patient/caregivers will verbalize understanding of the followin. COVID-19 symptoms and when to seek medical attention. 2. COVID-19 home management considerations. Disciplines: Interdisciplinary    Intervention: Facilitate Social Distancing     Dates: Start: 22       Description: Facilitate social distancing by taking the appropriate following measures:        1. Educate patient/caregivers about COVID-19 social distancing precautions. 2. Observe patient for psychosocial and mental status changes. 3. Suggest in-room activities. 4. Suggest alternative methods of communication with family/visitors. Intervention: Assess Respiratory Status for COVID-19     Dates: Start: 22       Description: Assess COVID-19 symptoms including fever, cough, and shortness of breath. Problem: Learning/Teaching Needs - Catheter     Dates: Start: 22       Description: Knowledge deficit related to home management of catheter.     Disciplines: Interdisciplinary    Goal: Knowledge of catheter care     Dates: Start: 22       Description: Patient/Caregiver will demonstrate ability to manage urinary catheter within 2 visits    Disciplines: Interdisciplinary Intervention: Instruct catheter cares     Dates: Start: 01/31/22       Description: Instruct patient/caregiver on how to empty drainage bag when it becomes filled, keep drainage bag below bladder and off the floor at all times, when to increase hydration, keep catheter secured to your leg to prevent it from moving, don't lay on your catheter or block flow of urine in the tube, need to irrigate as ordered by MD, and wash daily to keep catheter area clean. Problem: Pain     Dates: Start: 01/31/22       Description: Pain    Disciplines: Interdisciplinary    Goal: Assess satisfaction of level of comfort and symptom control     Dates: Start: 01/31/22       Description: Patient/caregiver/family will verbalize satisfaction with the patient's level of comfort and symptom control. Pt will report a reduction in pain to an acceptable level 0/10 within 6 hours of symptoms onset. Disciplines: Interdisciplinary    Intervention: Implement pharmacological pain management     Dates: Start: 01/31/22       Description: Instruct patient/caregiver on pharmacological pain management       Intervention: Assess for signs/symptoms of chronic pain     Dates: Start: 01/31/22       Description: Assess patient for signs and symptoms of chronic pain each visit       Intervention: Assess for signs/symptoms of acute pain     Dates: Start: 01/31/22       Description: Assess patient for signs and symptoms of acute pain each visit             Problem: Potential for Skin Breakdown     Dates: Start: 01/31/22       Description: Potential for Skin Breakdown    Disciplines: Interdisciplinary    Goal: Monitor skin for areas of alteration in skin integrity     Dates: Start: 01/31/22       Description: Patient/caregiver/family will demonstrate ability to care for patient's skin, monitor for areas of breakdown and demonstrate methods to prevent breakdown, and implement q2-4 hour turn schedule during awake hours, throughout hospice care. Disciplines: Interdisciplinary    Intervention: Assess for skin breakdown     Dates: Start: 01/31/22       Description: Assess patient for skin breakdown       Intervention: Instruct on strategies to reduce risk of skin breakdown     Dates: Start: 01/31/22       Description: Instruct caregiver on strategies to reduce risk of skin breakdown such as frequent turning and repositioning as tolerated, offloading of bony prominences, floating heels on pillows as tolerated. Application of skin barrier ointment to perianal area. Perform daily skin inspections. Problem: Risk for Falls     Dates: Start: 01/31/22       Description: Risk for Falls    Disciplines: Interdisciplinary    Goal: Absence of falls     Dates: Start: 01/31/22       Description: Patient will remain free of falls during hospice episode. Disciplines: Interdisciplinary    Intervention: Assess fall risk     Dates: Start: 01/31/22       Description: Complete fall risk assessment       Intervention: INSTRUCT FALL PREVENTION     Dates: Start: 01/31/22       Description: Aguayo Hunger caregiver on fall prevention strategies such as clutter free room, bed to lowest position, lighted room, personal things in reach of pt. and medication review with each SN[facility] as some medications may alter pt's mental status       Intervention: Instruct mobility (Completed)     Dates: Start: 01/31/22   End: 02/01/22    Description: Teach patient/caregiver techniques to increase patients mobility: range of motion exercises, assisting with ambulation, proper usage of mobility assistive devices, use of side rails to define bed perimeter and to assist with turning and positioning.              Problem: Urinary Catheter     Dates: Start: 01/31/22       Description: Urinary catheter due to urinary incontinence    Disciplines: Interdisciplinary    Goal: Maintain patent urinary catheter     Dates: Start: 01/31/22       Description: Patient's catheter will remain patent without signs/symptoms of infection throughout the treatment episode. Disciplines: Interdisciplinary    Intervention: Change catheter     Dates: Start: 01/31/22       Description: Perform urinary catheter change if no longer patent or leaking as needed. Encounter Problems (Active)     Problem: Alteration in Mobility     Dates: Start: 02/05/22       Disciplines: Interdisciplinary    Goal: Remain as independent as possible and remain safe in environment     Dates: Start: 02/05/22   Expected End: 02/13/22       Description: Patient will remain as independent as possible and remain safe in their environment. Disciplines: Interdisciplinary    Intervention: Assess for deficits in mobility     Dates: Start: 02/05/22          Intervention: Instruct on mobility methods     Dates: Start: 02/05/22       Description: Instruct patient/caregiver on mobility methods. Problem: Anxiety/Agitation     Dates: Start: 02/05/22       Disciplines: Interdisciplinary    Goal: Verbalize or staff assess the ability to manage anxiety     Dates: Start: 02/05/22   Expected End: 02/13/22       Description: The patient/family/caregiver will verbalize and demonstrate ability to manage the patient's anxiety throughout hospice care. Disciplines: Interdisciplinary    Intervention: Assess for anxiety/agitation     Dates: Start: 02/05/22       Description: Assess for signs and symptoms of anxiety and agitation. Intervention: Instruct/Implement strategies to reduce anxiety/agitation     Dates: Start: 02/05/22       Description: Instruct patient/caregiver on strategies to reduce anxiety/agitation.              Problem: Community Resource Needs     Dates: Start: 02/08/22       Disciplines: Interdisciplinary    Goal: Patient is receiving increased resource support to enhance ability to remain at home     Dates: Start: 02/08/22       Disciplines: Interdisciplinary    Intervention: Provides assistance with identifying appropriate community resources     Dates: Start: 02/08/22       Description: Provide assistance with identifying appropriate community resources. Patient is receiving increased resource support to enhance ability to remain at home. Problem: Discharge Planning     Dates: Start: 02/05/22       Disciplines: Interdisciplinary    Goal: *Participates in discharge planning     Dates: Start: 02/05/22   Expected End: 02/13/22       Disciplines: Interdisciplinary    Intervention: Healthcare knowledge assessment to include dying process, prognosis, treatment regimen, medications upon discharge     Dates: Start: 02/05/22          Intervention: Advanced care planning     Dates: Start: 02/05/22          Intervention: Identify support systems     Dates: Start: 02/05/22                Problem: Falls - Risk of     Dates: Start: 02/08/22       Disciplines: Interdisciplinary    Goal: *Absence of Falls     Dates: Start: 02/08/22       Description: Document Darlen Oscar Fall Risk and appropriate interventions in the flowsheet. Disciplines: Interdisciplinary          Problem: Infection - Risk of, Urinary Catheter-Associated Urinary Tract Infection     Dates: Start: 02/05/22       Disciplines: Interdisciplinary    Goal: *Absence of infection signs and symptoms     Dates: Start: 02/05/22   Expected End: 02/13/22       Disciplines: Interdisciplinary    Intervention: Urinary catheter needs assessment     Dates: Start: 02/05/22       Description: Medically/surgically unstable; Chemically paralyzed; Obstruction/retention; Strict I/Os;  Surgical procedure; Comfort Care; Multiple Stage 3 or 4 pressure ulcers, chest to knees       Intervention: Urine assessment (eg: Clarity; color; odor; volume)     Dates: Start: 02/05/22          Intervention: Infection signs and symptoms monitoring - urinary tract (eg: Flank or suprapubic pain; burning; fever; dysuria; frequency; urgency; cloudy/bloody/foul odor urine; confusion/agitation; incontinence) Dates: Start: 02/05/22          Intervention: Lab monitoring (eg: Urinalysis; culture and sensitivity; complete blood count with differential)     Dates: Start: 02/05/22          Intervention: Urinary catheter management (eg: Closed urinary catheter system maintenance; urinary catheter patency with free downhill flow; perineal care; monitor intake and output)     Dates: Start: 02/05/22          Intervention: Urinary catheter discontinuation     Dates: Start: 02/05/22                Problem: Pain     Dates: Start: 02/05/22       Disciplines: Interdisciplinary    Goal: Assess satisfaction of level of comfort and symptom control     Dates: Start: 02/05/22   Expected End: 02/13/22       Disciplines: Interdisciplinary    Intervention: Assess effectiveness of pain management     Dates: Start: 02/05/22          Intervention: Assess for signs/symptoms of acute pain     Dates: Start: 02/05/22          Intervention: Assess for signs/symptoms of chronic pain     Dates: Start: 02/05/22          Intervention: Implement non-pharmacological pain management     Dates: Start: 02/05/22          Intervention: Instruct on pain scales     Dates: Start: 02/05/22          Intervention: Implement pharmacological pain management     Dates: Start: 02/05/22             Goal: *Control of acute pain     Dates: Start: 02/05/22   Expected End: 02/13/22       Disciplines: Interdisciplinary    Intervention: Assess pain characteristics (eg: Intensity scale; onset; location; quality; severity; duration; frequency; radiation)     Dates: Start: 02/05/22          Intervention: Assess pain management - barriers (eg: Past pain experiences)     Dates: Start: 02/05/22          Intervention: Identify pain expectations (eg: Patient's pain goal; somatic experiences; behavioral changes; affect)     Dates: Start: 02/05/22          Intervention: Identify pain medication concerns (eg: Cultural considerations; addiction concerns)     Dates: Start: 02/05/22 Intervention: Support system identification (eg: Caregiver; community resource; family; friends; Mormon; support group)     Dates: Start: 02/05/22          Intervention: Monitor for change in patient condition (eg:  Vital signs changes; changes in level of consciousness; nausea; behavioral changes)     Dates: Start: 02/05/22          Intervention: Medication side-effect assessment     Dates: Start: 02/05/22          Intervention: Pain-relief response reassessment (eg: Frequency based on route of administration; effectiveness)     Dates: Start: 02/05/22                Problem: Patient Education: Go to Patient Education Activity     Dates: Start: 02/05/22       Disciplines: Interdisciplinary    Goal: Patient/Family Education     Dates: Start: 02/05/22   Expected End: 02/13/22       Disciplines: Interdisciplinary          Problem: Patient Education: Go to Patient Education Activity     Dates: Start: 02/08/22       Disciplines: Interdisciplinary    Goal: Patient/Family Education     Dates: Start: 02/08/22       Disciplines: Interdisciplinary          Problem: Potential for Alteration in Skin Integrity     Dates: Start: 02/05/22       Disciplines: Interdisciplinary    Goal: Monitor skin for areas of alteration in skin integrity     Dates: Start: 02/05/22   Expected End: 02/13/22       Description: Patient/family/caregiver will demonstrate ability to care for patient's skin, monitor for areas of breakdown, and demonstrate methods to prevent breakdown during hospice care.     Disciplines: Interdisciplinary    Intervention: Assess for skin breakdown     Dates: Start: 02/05/22          Intervention: Implement strategies to reduce risk of skin breakdown     Dates: Start: 02/05/22                Problem: Pressure Injury - Risk of     Dates: Start: 02/05/22       Disciplines: Interdisciplinary    Goal: *Prevention of pressure injury     Dates: Start: 02/05/22   Expected End: 02/13/22       Description: Document Wilner Scale and appropriate interventions in the flowsheet. Disciplines: Interdisciplinary          Problem: Risk for Falls     Dates: Start: 02/05/22       Disciplines: Interdisciplinary    Goal: Free of falls during inpatient stay     Dates: Start: 02/05/22   Expected End: 02/13/22       Description: Patient will be free of falls during inpatient stay. Disciplines: Interdisciplinary    Intervention: Assess fall risk     Dates: Start: 02/05/22       Description: Complete fall risk assessment on admission, recertification, and as indicated on fall. Intervention: Instruct on fall prevention     Dates: Start: 02/05/22       Description: Call for assistance with ambulation and transfers during inpatient stay             Problem: Spiritual Evaluation     Dates: Start: 02/08/22       Disciplines: Interdisciplinary    Goal: Identify beliefs/practices that support hospice experience     Dates: Start: 02/08/22       Description: Patient/family identify their beliefs/practices that impair Hospice experience. Patient/family identify their beliefs/practices that support Hospice experience. Patient coping identified. Spiritual distress identified and decreased with visit.     Disciplines: Interdisciplinary    Intervention: Assess spiritual needs     Dates: Start: 02/08/22       Description: Assist with spiritual questions            Care Plan Problems/Goals  Report    0 of 14 Goals Met 0 of 14 Met     Progressing Towards Goal (11)      *Prevention of pressure injury (Pressure Injury - Risk of)      Patient/Family Education (Patient Education: Go to Patient Education Activity)      Monitor skin for areas of alteration in skin integrity (Potential for Alteration in Skin Integrity)      Free of falls during inpatient stay (Risk for Falls)      Remain as independent as possible and remain safe in environment (Alteration in Mobility)      Assess satisfaction of level of comfort and symptom control (Pain)      *Control of acute pain (Pain)      Verbalize or staff assess the ability to manage anxiety (Anxiety/Agitation)      *Absence of infection signs and symptoms (Infection - Risk of, Urinary Catheter-Associated Urinary Tract Infection)      *Participates in discharge planning (Discharge Planning)      Patient is receiving increased resource support to enhance ability to remain at home [employer] Needs)                   No Outcome (3)      *Absence of Falls (Falls - Risk of)      Patient/Family Education (Patient Education: Go to Patient Education Activity)      Identify beliefs/practices that support hospice experience (Spiritual Evaluation)                        ___________________    Care Team Notes    IDG- clinical note- pt is GIP LOC at [facility] for pain and anxiety. Pt pain is in right side of abd, along with generalized discomfort. Yesterday some med changes were made to see about pt getting on a better regimen for home routine care. Today pt is appearing more comfortable and pt is save for transport. Pt will continue on new medication regimen. Will be d/c back to home for routine LOC later today. Transport being set up. POC/IDG Notes      [facility] [place] [facility]  Notes by Achille Severe at 02/08/22 1125  Version 1 of 1    Author: Achille Severe Service: Hospice and Palliative Care Author Type:     Filed: 02/08/22 1126 Date of Service: 02/08/22 1125 Status: Signed    : Achille Severe ()       [address] note:   LCSW communicating with home team after IDG rounds as pt has stabilized and is ready for dc home today and resume routine LOC. Home SW to set up transport. Family was at bedside during rounds and aware of plan. Meds that have been changed will be ordered and sent to local pharmacy for  by family.  LCSW will remain available for assistance as needed.     Problem: Discharge planning  Plan: LCSW collaborating with home team and family regarding plans for discharge  Community Resources: [facility] for [facility] care  Advance Directives: Daughter, Leigh Gardner is LNOK  DDNR: On file   Home: Mikayla Dee    [phone]       [address] [place] [address] IDG  Notes by Oscar Handy at 22 110  Version 1 of 1    Author: Oscar Handy Service: Hospice and Palliative Care Author Type: Pastoral Care    Filed: 22 Date of Service: 22 Status: Signed    : Oscar Handy (Pastoral Care)       Patient: Deepti Muse    Date: 22  Time: 11:02 AM    hospitals  Notes  Attended [facility] IDG Rounds. Patient was sitting up in bed visiting with her family. Plan of Care discussed with patient and family who are in agreement to take the patient home. Patient interacted through conversation and laughing. Patient being supported by home . Will collaborate with home  as while the patient is at the [facility]. No spiritual care needs noted today.           Signed by: Lola Garcia [id number] Team Present:     Roxanne Moran RN  Lucia Thomas RN, Nurse Leader

## 2022-02-08 NOTE — PROGRESS NOTES
[address] [place] [address]   [facility]  [phone]    Patient Name: Shoaib Hamilton  YOB: 1935    Date of Provider Hospice Visit: 02/08/22    Level of Care:   [x] General Inpatient (GIP)    [] Routine   [] Respite    Current Location of Care:  [] [facility] [] [facility] [] [facility] [] [facility] [x] Hospice [facility], patient referred from:  [] [facility] [] [facility] [] [facility] [] [facility] [x] Home [] Other:     Date of Original Hospice Admission: 12/5/2022  Hospice Medical Director at time of admission: Yoly Horn MD    Principle Hospice Diagnosis: Atherosclerotic heart disease of native coronary artery, unstable angina pectoralis  Diagnoses RELATED to the terminal prognosis: Non-ST elevation myocardial infarction, chronic obstructive pulmonary disease, 20-pack-year smoking history (half a pack for 40 years, quit 20 years ago), coronary artery disease, chronic kidney disease, peripheral vascular disease,   Other Diagnoses: Anemia, diverticulitis, fem-fem bypass, pmhx perforation of intestine with surgical intervention, obstructive sleep apnea, pmhx non-hodgkin lymphoma, presence of urogenital implants       HOSPICE SUMMARY   Do not cut and paste chart information other than imaging findings    Shoaib Hamilton is a 80y.o. year old who was admitted to [address] [place] [address]. The patient's principle diagnosis has resulted in Chest pain that was not able to be controlled in the home  Refer to LCD     Functionally, the patient's Karnofsky and/or Palliative Performance Scale has declined over a period of weeks and is estimated at 30. The patient is dependent on all ADLs:    Objective information that support this patients limited prognosis includes:  The patient's disease has continued to progress AEB periods of debility/bedbound status, disorientation, increased dyspnea/oxygen dependence, frequent episodes of chest pain unrelieved with NTG paste and requiring PRN doses of opioids, generalized pain    The patient/family chose comfort measures with the support of Hospice. HOSPICE DIAGNOSES   Active Symptoms:  1. Chest pain  2. Abdominal Pain  3. Fatigue/weakness  4. Anxiety/insomnia  5. Opioid induced constipation  6. Ulcerated oral lesion  7. Hospice care     PLAN     1. Pt seems to be doing much better overall and wants to go home. Family at bedside; daughter and granddaughter are willing and feel comfortable taking her home. 2. Recommend continue scheduled dilaudid at 1mg dose sublingual every 4 hours atleast during waking hours and 2mg sublingual every hour as needed. Assess pain control. She may do well with a fentanyl patch if requiring scheduled doses of dilaudid on an ongoing basis to avoid nighttime needs of dosing. 3. Continue Miralax for constipation and use prune juice as needed. 4. Pt's BP is regularly on low side in 30'Z Systolic and with her now being on opioids and valium regularly I do not see need for continuing nitro ointment as it may make her unnecessarily very tired. I would suggest d/c nitro ointment and home team monitor her BP with visits and if needed consider alternate forms of HTN control meds; do not think she will need. 5. Consider scheduling valium at bedtime as she is needing it regularly for sleep; not the best medicine for sleep but she is dependent on it and will have more withdrawal issues so suggest keep it on and schedule at bedtime. 6. Zofran tablet ODT as needed for nausea. 7. Pt has a small erythematous somewhat ulcerated oral lesion on superior palate that is painful; will use magic mouthwash with swabs for oral care 4 times a day. 8.  and SW to support family needs  9. Disposition: home later today  10.  Hospice Plan of care was reviewed in detail and agree with current plan of care    Prognosis estimated based on 02/08/22 clinical assessment is:   [] Hours to Days    [] Days to Weeks    [x] Other: 6 months or less    Communicated plan of care with: Hospice Case Manager; Hospice IDT; Care Team     GOALS OF CARE     Patient/Medical POA stated Goal of Care: pain control, comfort, and the ability to do things she wants to do    [x] I have reviewed and/or updated ACP information in the Advance Care Planning Navigator. This information is available in the [address] link in the patient's chart header. Primary Decision Maker (Health Care Agent):   Primary Decision MakeVlad Perez - [phone]  Along with her sister who lives in [place]    Resuscitation Status: DNR  If DNR is there a Durable DNR on file? : [x] Yes [] No (If no, complete Durable DNR)    HISTORY     History obtained from: family, chart    CHIEF COMPLAINT: mild belly pain when she rolls  The patient is:   [x] Verbal  [] Nonverbal  [] Unresponsive    HPI/SUBJECTIVE:  Ms Bri Springer had pizza Friday night which she thinks triggered her extreme chest pain, followed by a particularly hard bowel movement which made it even worse. Through the night, she was never able to get good pain control with her oral hydromorphone, so the decision was made Saturday to bring her to the hospice house to see if IV or SQ would work better    2/7; pt is sleepy and lethargic but well arousable and responds appropriately to simple questions; says she has no pain at this time. 2/8; pt seen in rounds, daughter & granddaughter at bedside. Pt in good spirits, feels better, slight belly pain on right flank area that comes and goes, no pain at this time. Has had a BM medium size today. Has gotten all scheduled morphine but no prn's. Gotten 2 doses of valium however for sleep and anxiety. Wants to go home.         REVIEW OF SYSTEMS     The following systems were: [x] reviewed  [] unable to be reviewed    Positive ROS include:  Constitutional: fatigue, weakness, mild pain  Ears/nose/mouth/throat:   Respiratory  Gastrointestinal:poor appetite, abdominal pain, constipation  Musculoskeletal:  Neurologic:weakness    Adult Non-Verbal Pain Assessment Score: 1    Face  [x] 0   No particular expression or smile  [] 1   Occasional grimace, tearing, frowning, wrinkled forehead  [] 2   Frequent grimace, tearing, frowning, wrinkled forehead    Activity (movement)  [x] 0   Lying quietly, normal position  [] 1   Seeking attention through movement or slow, cautious movement  [] 2   Restless, excessive activity and/or withdrawal reflexes    Guarding  [] 0   Lying quietly, no positioning of hands over areas of body  [x] 1   Splinting areas of the body, tense  [] 2   Rigid, stiff    Physiology (vital signs)  [x] 0   Stable vital signs  [] 1   Change in any of the following: SBP > 20mm Hg; HR > 20/minute  [] 2   Change in any of the following: SBP > 30mm Hg; HR > 25/minute    Respiratory  [x] 0   Baseline RR/SpO2, compliant with ventilator  [] 1   RR > 10 above baseline, or 5% drop SpO2, mild asynchrony with ventilator  [] 2   RR > 20 above baseline, or 10% drop SpO2, asynchrony with ventilator     FUNCTIONAL ASSESSMENT     Palliative Performance Scale (PPS): 30%       PSYCHOSOCIAL/SPIRITUAL ASSESSMENT     Active Problems:    * No active hospital problems. *    No past medical history on file. No past surgical history on file. Social History     Tobacco Use    Smoking status: Never Smoker    Smokeless tobacco: Never Used   Substance Use Topics    Alcohol use: Not Currently     No family history on file.    Allergies   Allergen Reactions    Ace Inhibitors Cough    Pantoprazole Nausea Only    Sulfamethoxazole-Trimethoprim Hives    Atorvastatin Myalgia    Erythromycin Base Nausea Only    Levofloxacin Other (comments)    Lorazepam Other (comments)     Paradoxical reaction    Pravastatin Myalgia    Seafood Other (comments)    Tramadol Other (comments)    Cefuroxime Hives    Methenamine Nausea Only    Penicillins Hives and Rash     hives  hives      Propoxyphene Other (comments)     Doesn't recall  tolerates norcoaltered mental status  tolerates norcoaltered mental status        Current Facility-Administered Medications   Medication Dose Route Frequency    HYDROmorphone (DILAUDID) 1 mg/mL oral solution 1 mg  1 mg Oral Q4H    HYDROmorphone (DILAUDID) 1 mg/mL oral solution 2 mg  2 mg SubLINGual Q1H PRN    magic mouthwash (FIRST-MOUTHWASH BLM) oral suspension 5 mL (Patient Supplied)  5 mL Oral QID    ondansetron (ZOFRAN ODT) tablet 4 mg  4 mg Oral Q6H PRN    acetaminophen (TYLENOL) tablet 650 mg  650 mg Oral Q4H PRN    polyethylene glycol (MIRALAX) packet 17 g  17 g Oral DAILY    bisacodyL (DULCOLAX) suppository 10 mg  10 mg Rectal DAILY PRN    haloperidol (HALDOL) 2 mg/mL oral solution 2 mg  2 mg SubLINGual Q6H PRN    acetaminophen (TYLENOL) suppository 650 mg  650 mg Rectal Q4H PRN    hyoscyamine SL (LEVSIN/SL) tablet 0.125 mg  0.125 mg SubLINGual Q4H PRN    diazePAM (VALIUM) tablet 2 mg  2 mg Oral Q6H PRN    prochlorperazine (COMPAZINE) tablet 10 mg  10 mg Oral Q6H PRN    albuterol-ipratropium (DUO-NEB) 2.5 MG-0.5 MG/3 ML (Patient Supplied)  3 mL Nebulization Q4H PRN    nitroglycerin (NITROBID) 2 % ointment 2 Inch (Patient Supplied)  2 Inch Topical Q6H        PHYSICAL EXAM     Wt Readings from Last 3 Encounters:   12/02/21 74.4 kg (164 lb)       Visit Vitals  BP (!) 93/59 (BP 1 Location: Left lower arm, BP Patient Position: Reclining;Supine)   Pulse 93   Temp 98.7 °F (37.1 °C)   Resp 18   SpO2 99%       Supplemental O2  [] Yes  [] NO  Last bowel movement: 2/8/22    Currently this patient has:  [] Peripheral IV [] PICC  [] PORT [] ICD    [] Bowling Catheter [] NG Tube   [] PEG Tube    [] Rectal Tube [] Drain  [] Other:     Constitutional: resting comfortably, awake, alert  Eyes: normal  ENMT: erythematous ulcerated lesion on superior palate in oral cavity, painful to touch  Cardiovascular: normal  Respiratory: not labored  Gastrointestinal: not tender on palpation, but tender when she rolls over  Musculoskeletal: no deformities  Skin: intact, warm  Neurologic:  tired  Psychiatric: normal  Other:       Pertinent Lab and or Imaging Tests:  No results found for: NA, K, CL, CO2, AGAP, GLU, BUN, CREA, BUCR, GFRAA, GFRNA, CA, GFRAA  No results found for: TP, ALBR, TALB, ALB        Total time: 35mts  Counseling / coordination time:   > 50% counseling / coordination?:

## 2022-02-08 NOTE — HOSPICE
1900:  Report received from [address]:  Pt assessed. Drowsy. C/O right sided abd pain. Tender to touch. Declined taking additional pain medication at present. Educated in benefit of taking medication before pain is out of control. 2007:  Scheduled nitroglycerin patch applied. Discussed signs that pt might have that would indicate med needed to be held. BP below 90, dizziness, or lightheadedness. 2030:  Discussed end of life signs with [place] [facility]. Gave booklet,, \"Gone From My Sight. \"   2102:  Magic mouthwash given. Pt able to swish and spit. 2157: Scheduled Dilaudid 1 mg and prn dose of Valium 2 mg given. Declined to turn. 2310:  Resting quietly eyes closed. Resp even and unlabored. Neutral facial expression. 0019:  Resting quietly with eyes closed. Resp even and unlabored. Neutral facial expression. 0102:  Awake and drowsy. Stated she was comfortable and did not want to turn. 0200:  Awakened to give 0200 meds. Denies pain. Repositioned self. Scheduled Nitropaste and Dilaudid given. 0300:  Resting quietly with eyes closed. Resp even and shallow. Neutral facial expression. 7258: Continues to rest quietly with eyes closed. Resp remain shallow and even. Neutral facial expression. 9000:  Continues to rest with eyes closed. Resp shallow and unlabored. Neutral facial expression. 6756:  Awakened pt to take scheduled Dilaudid. Tends to drink fluids too quickly with a straw causing her to cough. Noelle and writer encouraged pt to drink from a cup instead of a straw. Helped NA pull pt up in bed. Yelled out in pain and started crying. Stated,\" My back hurts. \"  Started to become anxious r/t to the pain. 5724:  Became tearful after coughing. Anxious. PRN dose of Valium 2 mg given after it being crushed and placed in applesauce. Instructed pt to tuck chin when swallowing. Took applesauce and water without coughing.     0700:  Stated, \" I feel better. \"  Report given to oncoming nurse. NAME OF PATIENT:  Blas Cox    LEVEL OF CARE:  GIP    REASON FOR GIP:   Pain, despite numerous changes in medications and Medication adjustment that must be monitored 24/7    *PATIENT REMAINS ELIGIBLE FOR GIP LEVEL OF CARE AS EVIDENCED BY: (MUST BE ADDRESSED OF PATIENT GIP)      REASON FOR RESPITE:  N/A    O2 SAFETY:  Concentrator positioning (6\" from furniture/drapes) and No petroleum based products on face while oxygen in use    FALL INTERVENTIONS PROVIDED:   Implemented/recommended resources for alarm system (personal alarm, bed alarm, call bell, etc.)     INTERDISPLINARY COMMUNICATION/COLLABORATION:  Physician, MSW, Shepherd and RN, CNA    NEW MEDICATION INITIATION DOCUMENTATION:  Magic mouthwash    Reason medication is being initiated:  Mouth ulceration    MD / Provider name consulted re: change in status / initiation of new medication:  Dr Jaime Aleman Symptom(s): Moth ulceration    New Order(s):  Magic mouthwash.  Swish and spit 5 ml qid    Name of the person notified of the changes:  Ptalvaro    Name of person being taught: Pt daughters  Instructions given:  Yes    Side Effects taught:  n/a    Response to teaching:  accepting      COMFORTABLE DYING MEASURE:  Is Patient/family satisfied with symptom level?  yes    DISCHARGE PLAN:  Home after symptoms managed

## 2022-02-08 NOTE — PROGRESS NOTES
Problem: Pressure Injury - Risk of  Goal: *Prevention of pressure injury  Description: Document Wilner Scale and appropriate interventions in the flowsheet. Outcome: Progressing Towards Goal  Note: Pressure Injury Interventions:  Sensory Interventions: Check visual cues for pain,Float heels,Keep linens dry and wrinkle-free,Maintain/enhance activity level,Minimize linen layers,Pad between skin to skin,Pressure redistribution bed/mattress (bed type),Turn and reposition approx. every two hours (pillows and wedges if needed)    Moisture Interventions: Apply protective barrier, creams and emollients,Internal/External urinary devices,Limit adult briefs,Maintain skin hydration (lotion/cream),Minimize layers,Moisture barrier    Activity Interventions: Pressure redistribution bed/mattress(bed type)    Mobility Interventions: Float heels,Pressure redistribution bed/mattress (bed type),Turn and reposition approx. every two hours(pillow and wedges)    Nutrition Interventions: Document food/fluid/supplement intake,Offer support with meals,snacks and hydration    Friction and Shear Interventions: HOB 30 degrees or less,Minimize layers                Problem: Potential for Alteration in Skin Integrity  Goal: Monitor skin for areas of alteration in skin integrity  Description: Patient/family/caregiver will demonstrate ability to care for patient's skin, monitor for areas of breakdown, and demonstrate methods to prevent breakdown during hospice care. Outcome: Progressing Towards Goal     Problem: Risk for Falls  Goal: Free of falls during inpatient stay  Description: Patient will be free of falls during inpatient stay. Outcome: Progressing Towards Goal     Problem: Alteration in Mobility  Goal: Remain as independent as possible and remain safe in environment  Description: Patient will remain as independent as possible and remain safe in their environment.   Outcome: Progressing Towards Goal     Problem: Pain  Goal: Assess satisfaction of level of comfort and symptom control  Outcome: Progressing Towards Goal  Goal: *Control of acute pain  Outcome: Progressing Towards Goal     Problem: Anxiety/Agitation  Goal: Verbalize or staff assess the ability to manage anxiety  Description: The patient/family/caregiver will verbalize and demonstrate ability to manage the patient's anxiety throughout hospice care.   Outcome: Progressing Towards Goal     Problem: Infection - Risk of, Urinary Catheter-Associated Urinary Tract Infection  Goal: *Absence of infection signs and symptoms  Outcome: Progressing Towards Goal

## 2022-02-08 NOTE — PROGRESS NOTES
Problem: Pressure Injury - Risk of  Goal: *Prevention of pressure injury  Description: Document Wilner Scale and appropriate interventions in the flowsheet. Outcome: Resolved/Met     Problem: Patient Education: Go to Patient Education Activity  Goal: Patient/Family Education  Outcome: Resolved/Met     Problem: Potential for Alteration in Skin Integrity  Goal: Monitor skin for areas of alteration in skin integrity  Description: Patient/family/caregiver will demonstrate ability to care for patient's skin, monitor for areas of breakdown, and demonstrate methods to prevent breakdown during hospice care. Outcome: Resolved/Met     Problem: Risk for Falls  Goal: Free of falls during inpatient stay  Description: Patient will be free of falls during inpatient stay. Outcome: Resolved/Met     Problem: Alteration in Mobility  Goal: Remain as independent as possible and remain safe in environment  Description: Patient will remain as independent as possible and remain safe in their environment. Outcome: Resolved/Met     Problem: Pain  Goal: Assess satisfaction of level of comfort and symptom control  Outcome: Resolved/Met  Goal: *Control of acute pain  Outcome: Resolved/Met     Problem: Anxiety/Agitation  Goal: Verbalize or staff assess the ability to manage anxiety  Description: The patient/family/caregiver will verbalize and demonstrate ability to manage the patient's anxiety throughout hospice care. Outcome: Resolved/Met     Problem: Infection - Risk of, Urinary Catheter-Associated Urinary Tract Infection  Goal: *Absence of infection signs and symptoms  Outcome: Resolved/Met     Problem: Discharge Planning  Goal: *Participates in discharge planning  Outcome: Resolved/Met     Problem: Falls - Risk of  Goal: *Absence of Falls  Description: Document Kimberli Fall Risk and appropriate interventions in the flowsheet.   Outcome: Resolved/Met     Problem: Patient Education: Go to Patient Education Activity  Goal: Patient/Family Education  Outcome: Resolved/Met     Problem: Spiritual Evaluation  Goal: Identify beliefs/practices that support hospice experience  Description: Patient/family identify their beliefs/practices that impair Hospice experience. Patient/family identify their beliefs/practices that support Hospice experience. Patient coping identified. Spiritual distress identified and decreased with visit.   Outcome: Resolved/Met     Problem: Community Resource Needs  Goal: Patient is receiving increased resource support to enhance ability to remain at home  Outcome: Resolved/Met

## 2022-02-08 NOTE — [facility] IDG SOCIAL WORKER NOTES
[address] note:   LCSW communicating with home team after IDG rounds as pt has stabilized and is ready for dc home today and resume routine LOC. Home SW to set up transport. Family was at bedside during rounds and aware of plan. Meds that have been changed will be ordered and sent to local pharmacy for  by family.  LCSW will remain available for assistance as needed.     Problem: Discharge planning  Plan: LCSW collaborating with home team and family regarding plans for discharge  Community Resources: [facility]  Advance Directives: Daughter, Suzanne Mauro is Sultana Evans  DDNR: On file   Home: [place]    [phone]

## 2022-02-08 NOTE — PROGRESS NOTES
.  0700:Report received from [place] using [email or web address] I/O and [facility] Pt is alert awake slightly drowsy responds appropriately to questions denies pain no SOB noted comfort and safety maintained. 08:46:Adm schedule meds, tolerated well.  09:45:Pt had a medium semi formed  BM, incontinent care done complain of stomach cramps   11:00: IDG rounds with Dr Moriah Vann and team pt assess no changes made , pt will be discharge today dtr at bedside and agrees with decision caregivers are already in place. 1200:Pt resting with eyes closed, no complaints at present. 12:30:Report called to Thi Burnett RN who will review pt meds at home and order as needed. 12:51:Pt complain of abd cramps adm dilaudid 2 mg.  13:20:[facility] Transport arrive, pt diaper is checked clean and dry, arevalo patent. Dtr took all of her belongings home. Pt is very appreciative denies pain before transport.         NAME OF PATIENT:     LEVEL OF CARE: Respite  REASON FOR GIP: N/A     *PATIENT REMAINS ELIGIBLE FOR GIP LEVEL OF CARE AS EVIDENCED BY: n/a    REASON FOR RESPITE:  Caregiver Exhaustion     O2 SAFETY:  Concentrator positioning (6\" from furniture/drapes), Tanks stored in fisher , No petroleum based products on face while oxygen in use and Oxygen sign on the door     FALL INTERVENTIONS PROVIDED:   Implemented/recommended use of fall risk identification flag to all team members, Implemented/recommended assistive devices and encouraged their use, Implemented/recommended resources for alarm system (personal alarm, bed alarm, call bell, etc.)  and Implemented/recommended environmental changes (remove hazards, lower bed, improve lighting, etc.)     INTERDISPLINARY COMMUNICATION/COLLABORATION:  Physician, MSW, Silva and RN, CNA     NEW MEDICATION INITIATION DOCUMENTATION:N/A      Reason medication is being initiated:  N/A     MD / Provider name consulted re: change in status / initiation of new medication:  N/A     New Symptom(s):  N/A     New Order(s):  N/A     Name of the person notified of the changes:  N/A     Name of person being taught:  N/A     Instructions given:  N/A     Side Effects taught:  N/A     Response to teaching:  N/A        COMFORTABLE DYING MEASURE:  Is Patient/family satisfied with symptom level?  yes     DISCHARGE PLAN:Pt will be discharge today.

## 2022-02-08 NOTE — HOSPICE
LMSW discussed disposition with patient, patient's daughter, and patient's granddaughter. Patient's daughter stated that patient has caregivers for daytime and overnight but may need additional caregiver options for weekends. LMSW offered resource guide. Patient's daughter stated that she is going to look for natural supports first but will let LMSW know if caregiving resources are needed. LMSW set up transportation for 1:30 PM today for discharge home. Tuck-In visit scheduled with home RN Lupe Estrella.      Time In: 10:52  Time Out: 11:10    FAY Cody, [address] [facility]   [phone]

## 2022-02-08 NOTE — [facility] IDG CHAPLAIN NOTES
Patient: Bernard Mckinnon    Date: 02/08/22  Time: 11:02 AM    [facility]  Notes  Attended [facility] IDG Rounds. Patient was sitting up in bed visiting with her family. Plan of Care discussed with patient and family who are in agreement to take the patient home. Patient interacted through conversation and laughing. Patient being supported by home . Will collaborate with home  as while the patient is at the [facility]. No spiritual care needs noted today.           Signed by: Partha Barclay

## 2022-02-08 NOTE — PROGRESS NOTES
LCSW communicating with home team after IDG rounds as pt has stabilized and is ready for dc home today and resume routine LOC. Home SW to set up transport. Family was at bedside during rounds and aware of plan. Meds that have been changed will be ordered and sent to local pharmacy for  by family. LCSW will remain available for assistance as needed.     FAY Bermeo, [facility]   [phone]

## 2022-02-09 NOTE — HOSPICE
[facility]  [phone]    Social Work Admission Note  Patient Name: Jeane Head  YOB: 1935  Age: 80 y.o. Date of Visit: 02/05/2022  Facility of Care: [facility]  Patient Room: 07/01     Hospice Attending: No att. providers found  Hospice Diagnosis: atherosclerosis    Level of Care:    [x]  GIP    []  Respite   []  Routine    Consents/NCD Documentation:     Consents Reviewed:   []  Yes  [x]  No, completed by other hospice staff member. Person Reviewed/Signed with:  []  Patient   [x]  Pts NOK/MPOA  Name: Jay Shultz    Right to NCD Reviewed:   []  Yes  [x]  No, completed by other hospice staff member. NCD Requested:   []  Yes  [x]  No    Admission Nurse/Intake Notified NCD was requested:  []  Yes  [x]  No  []  Not requested    Planned Start of Care Date: 01/13/2022    Hospice Witness Representative:     MICHAELA     MSW and FAY saleem met with patient and family for initial visit when patient arrived to [facility] for GIP stay. Patient observed lying in hospital bed, dozing off and on during visit. Patient's two daughters at bedside and very attentive to patient. Daughter Vinod Nieves, is primary caregiver and patient lives with her. Patient's other daughter had just arrived from out of town for visit. Daughters worried at patient's decline and increased pain, but very appreciative of hospice care and support in managing symptoms and bringing patient to [facility]. Daughter, Vinod Nieves, shared that she wants patient to be able to return home, but is concerned that patient have the care that she needs and shared she is fine with patient remaining at [facility] as long as needed. MSW educated daughter on levels of care at [facility] and that team will work to get patient's symptoms managed so that she can return home. Daughters asked if they could take patient home with IV if they hired a nurse. MSW educated that IVs can only be administered in a medical setting at this time.  Daughter accepting and understanding of this and shared that patient would want to be home and they want to honor her wishes. Patient roused several times during visit, patient talked, but speech often did not make sense. Daughters showed some anxiety at this. MSW provided reassurance that team is working to keep patient comfortable and that the medication may make her drowsy or out of it at times. Patient's dinner tray was brought to the room and daughters tried to wake patient up to feed her, but patient was not alert. MSW advised that they should wait for patient to wake up fully before offering her food for safety reasons. Daughters understanding, noted that they know not to feed her when she is sleepy, but both daughters anxious and trying to make patient as comfortable as possible. MSW provided emotional support through active listening and supportive counseling as daughters shared their concerns at change in patient's health and status. MSWs left so that patient could rest and daughters could have time to visit. MSW will continue to provide emotional support to patient and daughters as needed and accepted. ADVANCE CARE PLANNING    Advance Directive:  []  Yes  []  No   []  Would like to complete X unknown- MSW will follow up, no AMD on record  Primary MPOA: Lian Morales  Secondary MPOA:   MERLINOK:   Code Status: DNR  Durable DNR: _X_ Yes  _ No  No flowsheet data found.     Relationship Status:  []  Single     []        []      []  Domestic Partner     []  /  []  Common Law  []    [x]  Unknown    If in a relationship, name of partner/spouse:  Duration of relationship:    Baptist/Spirituality: No Spiritism on file  []  Active In Baptist/Spirituality   []  Not Active   [x]  N/A  Notes:     Home: [address] on [employer] Provided: None needed at this time  []  [employer]  []  Information on applying for disability  []  FMLA Paperwork  []  Letters Requested by [facility]   []  [address] Policy  []  Final Arrangements Resources   []  Outside counseling services (individual or group therapy)  []  Grief resources   []  Steven Leblanc  []  [employer]   []  [address]   []  [employer]   []  Referral Sent to [employer]   []  Referral Sent to Music Therapy   []  Referral Sent to Pet Therapy  []  Other  Social Work Initial Assessment     Sex:  female    Pronoun Preference:   []  He/Him/His   [x]  She/Her/Hers   []  They/Them/Theirs   []   Patient Name   []   Decline to Answer  []   Unknown  []   Other   Notes:     Race/Ethnicity: (deloris all that apply)  []  [address] or [place] Native  []    []  Black or [place] American  []   or   []  [address] [facility] or [facility]  [x]  White  []  Unknown  []  Other     Inpatient Financial Agreement Completed:   [x]  Yes  []  No    Insurance:   [x]  Medicare   []  Medicaid     []  [employer]    []  Self-Pay/Uninsured   Notes:      Has pt applied for FAP? []  Needs to Apply  []  Application Completed and Submitted   []  Approved/ Expiration Date:   [x]  N/A  Notes:     Has pt applied for Medicaid? []  Needs to Apply  []  Application Completed and Submitted/Application Number:   [x]  N/A  Notes:     Has a long term care screening ([facility]) been requested? []  Requested   []  Not Requested, Needs follow up  []  Completed  [x]  N/A  Notes:     Does the pt have a long term care insurance policy? []  Yes  [x]  No  []  Yes, Needing assistance with paperwork  Notes:      Service:    []  Yes   []  No       [x]  Unknown    Appropriate for Pinning Ceremony:   []  Yes      [x]  No    Is patient using VA benefits? []  Yes      [x]  No  []  Needs assistance with accessing benefits  Notes:       Work History:   []  Full-Time/Part-Time  []  Retired   []  Other  Notes:     Primary Language: English  []   Needed  []   utilized during visit  []   Waived/ form completed    Ability to express thoughts/needs/feelings  []  Expressed thoughts/feelings/needs without difficulty  []  Requires extra time and cuing  []  Speech limited single words  []  Uses only gestures (eye, blinking eye or head movement/pointing)  [x]  Unable to express thoughts/feelings/needs (speech unintelligible or inappropriate)  []  Unresponsive  Notes:      Mental Status:  []  Alert-oriented to:     []  Person     []  Place     []  Time  []  Comatose-responds to:    []   Verbal stimuli    []  Tactile stimuli    []  Painful stimuli  []  Forgetful  []  Disoriented/Confused  [x]  Lethargic  []  Agitated  []  Unresponsive  []  Other (specify):    Notes:      Patients description of Illness/Current Health Status:    [x]  Patient unable to discuss  []  Patient unwilling to discuss  []  (Specify)        Knowledge/Understanding of Disease Process  Patient:    []  Demonstrates knowledge/understanding of disease process   []  Demonstrates knowledge/understanding of treatment plan   []  Demonstrates knowledge/understanding of prognosis   []  Demonstrates acceptance of prognosis   []  Demonstrates knowledge/understanding of resuscitation status   []  Other (specify)  Caregiver:   [x]  Demonstrates knowledge/understanding of disease process   [x]  Demonstrates knowledge/understanding of treatment plan   [x]  Demonstrates knowledge/understanding of prognosis   [x]  Demonstrates acceptance of prognosis   [x]  Demonstrates knowledge/understanding of resuscitation status   []  Other (specify)  Notes:      Patients living arrangement/care setting:  Use the PRIOR COLUMN when the PATIENTS current health status necessitated a change in his/her primary residence.     Prior Current Response              [x]             []    Patients own home/residence              []             []    Home of family member/friend              []             []    Boarding home/Group Home              []             []    Assisted living facility/MCFP center              []             []    Hospital/Acute care facility              []             []    Skilled nursing facility              []             []    Long term care facility/Nursing home              []             [x]    Hospice in Patient      Primary Caregiver:  []  No Primary Caregiver  Name of Primary Caregiver: Reid Swanson  Primary Caregiver Phone Number:   Relationship or Primary Caregiver:    []  Spouse/Significant other       [x]  Child        []  Step child       []  Sibling   []  Parent   []  Friend/Neighbor   []  Community/Baptism Volunteer   []  Paid help   []  Other (specify):___________  Notes:       Family members/Significant others:  Name: Reid Swanson  Relationship: Daughter  Phone Number: [phone]  Actively involved in care? [x]  Yes  []  No    Name:  Relationship:  Phone Number:  Actively involved in care?   []  Yes  []  No    Social support systems: (select ONE best description)  [x]  Excellent social support system which includes three or more family members or friends  []  Good social support system which includes two or less members or friends  []  [address] support which includes one family member or friend  []  Poor social support; no family members or friends; basically ALONE  Notes:      Emotional Status: (deloris all that apply)    Patient Caregiver Response                 []                [x]    Mood/Affect stable and appropriate                   []                []    Angry                 []                [x]    Anxious                 []                []    Apprehensive                 []                []    Avoidant                 []                []    Clinging                 []                []    Depressed                 []                []    Distraught                 []                []    Fearful                 []                []    Flat Affect                 []                []    Helpless                 [] []    Hostile                 []                []    Impulsive                 []                []    Irritable                 []                []    Labile                 []                []    Manic                 []                []    Restlessness                 []                []    Sad                 []                []    Strain/Stress                 []                []    Suspicious                 []                []     Tearful                 []                 []     Withdrawn          Notes:     Coping Skills (strengths/weakness):    Patient: Coping Skills (strength/weakness): Family is loving and supportive   Family/caregiver (strength/weakness): Family is very supportive of each other, some anxiety around changes in patient's status     [place] of care upon discharge (deloris all that apply):     []  No burden evident   []  Family must administer medications   []  Illness causing financial strain   []  Family/Support feels overwhelmed   [x]  Family/Support sleep disturbed with patients care   []  Patients care causes extra physical stress  of death  []  Illness causes changes in family lifestyle  []  Illness impacting family/support employment  []  Family experiencing increased time demands  []  Patients behavior endangers family  []  Denial of patients illness  []  Concern over outcome of illness/fear  []  Patients behavior embarrassing to family   Notes:      Risk Factors: (deloris all that apply):    [x]  No burden evident   []  Alcohol abuse  []  Financial resources inadequate to meet basic needs (food/house/etc)  []  Financial resources inadequate to meet health care needs (supplies/equipment/medications)  []  Food/nutrition resources inadequate  []  Home environment unsafe/inadequate for home care  []  Homicidal risk  []  Lives alone or without concerned relatives  []  Multiple medications/complex schedule  []  Physical limitations increase likelihood of falls  []  Plan of care/treatments complicated  []  Substance use/abuse  []  Suicidal risk  []  Visual impairment threatens safety/ability to perform self-care  []  Other (specify):     Abuse/Neglect (actual/potential risks):  [x]  No signs of abuse/neglect  []  History of abuse/neglect                 []  Physical          []  Sexual  []  History of domestic violence  []  Lacks adequate physical care  []  Lacks emotional nurturing/support  []  Lacks appropriate stimulation/cognitive experiences  []  Left alone inappropriately  []  Lacks necessary supervision  []  Inadequate or delayed medical care  []  Unsafe environment (i.e guns/drug use/history of violence in the home/etc.)  []  Bruising or other physical signs of injury present  []  Refer to child/adult protective services  []  Other (specify):   Notes:      Current Sources of Stress (in Addition to Current Illness):   [x]  None reported  []  Bills/Debt    []  Career/Job change    []   (short term)    []   (long term)    []  Death of a child (recent)    []  Death of a parent (recent)   []  Death of a spouse (recent)   []  Employment status changed   []  Family discord    []  Financial loss/Inadequate inther (specify):come  []  Job loss  []  Legal issues unresolved  []  Lifestyle change  []  Marital discord  []  Marriage within the last year  []  Paperwork (insurance/legal/etc) overwhelming  []  Separation/Divorce  []  Other (specify):  Notes:       Interventions/Plan of Care   [x]  MSW will assess social and emotional factors related to coping with end of life issues  []  MSW will provide community resource planning/referral   []  MSW to assist with relocation to different care setting if/when symptoms stabilize  [x]  Pt/Pts family will receive emotional support. []  Pt/Pts family will share the details surrounding the pts disease progression  []  Pt/Pts Family will show expression of grief by participating in life review.   []  Pt/Pts Family will be educated and able to verbalize understanding of mental, emotional, and physical symptoms of grief. []  Pt/Pts Family will be educated and able to identify skills and social support to help cope with grief. []  Pts family will receive support for grief with emphasis on developmentally appropriate language.   [] Other:   Notes:       Discharge Planning   [x]  Home with family member    []  Return back to nursing home/facility  []  Needs assistance with placement/paid caregivers  []  Short Stay under routine level of care at [facility]   []  Other  Notes:     MSW Assessment Completed by: FAY Ayers  02/05/2022  Time In: 5:30 pm       Time Out: 6:15 pm

## 2022-02-09 NOTE — HOSPICE
PRN visit made at request of patient's daughter Lian to remove patient's IV in upper right arm. Pt was discharged from [facility] today and was not aware of the IV still in her arm until she was repositioned at home and felt discomfort. RN removed IV and applied gauze and tape. Pt tolerated well. Pt also denied pain at time of visit, and dtr advised pain med being administered q4h. RN reminded dtr hospice is available by phone 24x7. Dtr verbalized understanding.

## 2022-02-09 NOTE — HOSPICE
Visiting today for effie in visit following GIP stay at [facility]. Report received from Yolanda Etienne. Patient's pain was able to be well controlled with new dosing of dilaudid and addition of miralax to prevent constipation/straining. Received patient in hospital bed dressed in gown and briefs. Patient appears tired, and drifts off to sleep several times during visit. Patient is oriented to person, place and situation. Bowling catheter remains, draining irais clear urine without difficulty. Patient remains on 4L/min O2 via NC. Tubing and NC changed today during visit and extra tubing left with daughter from trunk stock. Reinforced teaching to have patient sip from cup instead of using straw, as patient drinks too quickly and starts to cough when using straw. Lian and paid caregiver Milagros Delarosa verbalize understanding. Discussed aspiration risks and ways to prevent such as keeping patient fully upright to eat and drink. Family requests resuming HHA visits, email sent to  and HHA. Discussed use of miralax and senna to keep patient moving bowels without straining. Lian verbalizes understanding. Supplies requested: XL large briefs with tabs, wipes, lemon glycerine swabs. Refills ordered: Miralax, dilaudid, valium via [place] #[id number]. Encouraged family to call hospice 24/7 with any needs or change in patient's condition. Lian verbalizes understanding and agrees.

## 2022-02-10 NOTE — HOSPICE
Arrived to home of patient with Dc miller. Visit is for follow up from [facility] on chest pain and lower right abdomen pain yesterday. Patient is in bed. Daughter Heatherkeshia Odellnava reports that patient had several PRN doses of pain medication yesterday after daughter texted the RNCM due to chest pain. Patient now is having pain in her lower right abdomen. Pain is dull at times, absent at times, and stabbing at times. Patient points to area of pain. Area was tender for daughter to touch. Patient allows RN to assess area. Hard area about three inches felt. Patient has flatus and hyper bowel sounds. Patient has not had a bowel movement for several days. Urine in Bowling bag is cloudy yellow with some sediment. Patient eating and drinking minimally. Patient is unable to suck straw. RN discourages straw due to patient frustration and possible aspiration. Paid caregiver Julian Thomas gives a small sip of apple juice and patient swallows slowly. RN educates to use two swallow approach to assure anything PO goes down and if patient coughs, to stop. Education given on elevation of HOB. Patient can eat a few pieces of watermelon with no trouble. Patient is calm and cooperative through visit, stating her pain is 5/10 when abdomen is hurting. RN administers a bisacodyl suppository and changes brief. RN shows Julian Thomas, paid caregiver and daughter, how to use washcloth to clean patient mouth several times daily and use lemon glycerin swabs and toothettes. Patient mouth is filmy and not clean. Patient enjoys getting her mouth clean and takes another sip of apple juice. Patient repositioned in bed. Patient heart rate is elevated and irregular. Patient is hypotensive. While RN is assisting patient,  talks with Heather lowry. No supplies are needed at this time. Medication ordered:  bisacodyl suppositories:#[id number]. Patient's daughter feels that patient may like Music therapy.   Patient used to sing in her [employer] choir. Told Lian to contact hospice main number if there is a question, or a change in patient status.

## 2022-02-13 NOTE — HOSPICE
Upon visit arrival patient lying supine in hospital bed alert though forgetful. O2 via nc at 4 lpm. Denies shortness of breath. Pt. rates pain a 2/10 right lower quadrant abdomen. Assessment done and abdomen soft. Daughter states patient has had several loose stools after receiving Dulcolax suppository on the 10 th. RLQ Abdomen has less discomfort now that she is passing loose stoolsrectal vault checked no stool present. Daughter states patient has been drinking prune juice daily I encouraged that to keep bowels regular. I also reminded daughter that she can use the Dulcolax suppositories daily if needed. Daughter administered one dose of Hydromorphone during this visit then patient rested with eyes closed.  Daughter encouraged to call hospice with any questions or concerns she verbalized understanding

## 2022-02-15 NOTE — HOSPICE
Arrived for PRN visit to assess patient after call over the weekend that patient heart rate was elevated. Patient assessed. Patient heart rate between 145-150 over two minutes of apical auscultation. Patient shows grimacing when moved and changed. RN administers a suppository upon request of daughter, and after abdomen assessed. Patient still has tender area in RLQ. Patient had pain medication at 7:00 am.  RN administers pain medication during visit. Patient gopal-care completed, new brief and Chux applied. Patient pulled up in bed. NP, Rah Sandra contacted and order obtained to discontinue the Nitrobid paste for a twenty-four hour period due to patient tachycardia. RN and NP to re-assess patient the next business day at scheduled visit at 13:30. Respirations of patient between 10-12 per minute. No apnea noted. Breathing non-labored, but shallow inspiration. Patient is lethargic, but able to answer questions, help with turning, and drink juice. Education given to Lian on non-verbal pain cues. Medication reconciled:  Dissolving Zofran ordered:  #[id number]. Supplies ordered:  Chux, wipes, calazime:  #[id number]. To follow up on at tomorrow's visit with NP, Bhupendra Mckenna:Tomorrow:    Sore in back of her throat-currently has swish and spit solution  Pain  Bowels-daughter asked if her bowel could be blocked, could she have diverticulitis, could it be UTI  Heart rate    Told Lian to contact hospice with questions, concerns, or change in patient status.

## 2022-02-15 NOTE — HOSPICE
LMSW made visit to patient while patient was GIP at [facility]. Documentation charted in Hyperspace due to GIP status.

## 2022-02-15 NOTE — HOSPICE
Follow-up visit with Vahe YU, her orientee, GIGI Barros, and this RN. Patient has not had Nitro patch since yesterday at 13:00. She ate a few bites for dinner and lunch. She did get chest pain after eating the soup and cracker. She indicated that patient's heart rate did go down significantly after removing Nitro patch. Daughter is concerned about giving pain medication at 1 ml. She is now giving the medication at 0.5 ml instead so that her mother can have more energy. Daughter states that patient was at [address] when she had a heart attack. Sister Claudy Love on phone on speaker. Offered to her to ask questions. Daughter states that patient had a Small BM yesterday after eating a few sips of soup. Patient decided on comfort care and now she doesn't understand why she cannot get out of bed. NP, Vahe Wilkinson explains to family about the disease process to the family. They are struggling with hospice dx and want to understand the process. Claudy Love asks about her mother not having surgery. Family feels like they want to understand if they made the right decision because patient is lucid at times but her heart is falling apart. 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 Vahe Wilkinson NP explains side effects of all of the medications patient is taking. Bernardo Vinayak also states that patient has plaques all over body. 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 Plaques all over her body. Kidney failure with no active symptoms currently. Bowel plan:  senna)plain and also bisacodyl suppositories    Medication adjustments after patient assessment. Discontinue Nitro paste for now and re-evaluate at each RN visit. Patient may need to go back on a small dose. Add senna-regular instead of senna S. Add triamcinolone acetide dental paste twice daily. Apply with sterile tip in thin layer twice daily.   Have patient try not to eat, drink, or swallow for 15 minutes. 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 New Order(s): Apply thin layer of paste BID with sterile swab twice daily. Have patient not eat, drink or swallow for 15 minutes after. Name of person being taught: Lian and Scar Humphrey  Instructions given: YES  Side Effects taught:YES  Response to teaching: Family states understanding. Patient assessment shows: We will stay on Nitro paste and continue to re-evaluate patient symptoms. Suppository given by this RN:  14:45 with no difficulty. Patient shows signs of discomfort with movement. Patient currently on oxygen continuous 4 LPM.    Sterile Q tips for application of medication to sore ordered. Bisacodyl daily until regular BM's. 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 Thin coat on ulcer twice daily, do not swallow for a few minutes. Triamcinolone Acetinide 0.1% dental paste-5 gram tube called into [address] on [facility].   Senna instead of Senna S to be used for constipation. Family gathered again for final questions. Family feels that they have a better understanding of what patient is experiencing. Patient will need more frequent visit set to follow up on changes.

## 2022-02-22 NOTE — HOSPICE
Upon arrival to the home, daughter Lian and her  escort RNCM to living room to sit and talk a few minutes about patient's condition, Renettacatalino Singh and Cary's visit on Tuesday. Family verbalize great appreciation of all the support, time and care of hospice team. Family asks clarifying questions related to patient's condition and all questions answered. Explained that if eating causes the patient discomfort, the family shouldn't push the patient to eat but if the patient requests food, to aleisha her wishes. Explained that most people associate food with love and care, but with hospice patient's they often will stop eating when they are uncomfortable and we need to respect the patient's wishes. Discussed hospice philosophy that the patient should be allowed to guide their journey as they see fit as much as possible. Family understand. Encouraged family not to wake patient during the night to medicate, bu not to skip more than one dose of pain medication at a time. Lian reports they have only used the nitroglycerin paste one time since Kraig Naik visited. Encouraged to use once daily if it will prevent more chest pain. Lian verbalizes understanding. Will apply at 6 pm as patient typically has more chest pain in evenings. Encouraged family to continue using dulcolax suppositories daily to prevent constipation. Refill of suppositories ordered via [facility] for delivery, confirmation # [id number]. No supplies requested at this time. Encouraged family to call hospice 24/7 with any needs or change in condition. Discussed that patient may have a massive heart attack at any time causing her passing. Family verbalize understanding.

## 2022-02-22 NOTE — HOSPICE
ALVIN and Ayesha Sears made joint visit for emotional support to patient and family. Fred was soundly sleeping. Patient's daughter Noelle Stapleton and son in law Kat Night met with LMSW and  in a room away from patient to offer patient rest. Lian inquired how they are doing in their roles as caregivers. LMSW provided validation and reassurance including discussion of logistical needs being covered, allowing for intentional presence with patient. Lian and Kat Pastrana processed feelings about various end of life issues and voiced related questions. LMSW and  provided reassurance, validation of feeling and validation of role. Lian shared how she has been primary caregiver for the patient since 2016. LMSW provided bereavement services information for family. LMSW will continue to be available to provide support and address needs that arise.

## 2022-02-23 NOTE — HOSPICE
Arrived to home and patient in bed with paid caregiver, Roxana, in home at bedside. Daughter, Manda August in home. Patient having regular bowel movements. Family is giving suppository daily. Patient will have several bowel movements daily. Patient not complaining of abdomen pain anymore. Patient is still having discomfort when she has a bowel movement. For several days she has eaten some small sips and popsicles. She has had one episode of chest pain since last week visit with NP. Family currently giving 0.5 ml Dilaudid every four hours. They are giving nitrobid paste one time nightly and Valium. Patient is sensitive to touch. Bowling is irritating patient. Urine is decreased and darker. Bowling re-adjusted, new Stat-Lock put on with some flexibility in line, so when patient moves, she is not tugging on the Bowling line. Patient given pain medication by daughter. Bisacodyl suppository given by this RN, with no difficulty. Patient now has 100 cc of cloudy urine in Bowling bag. Oral care done to patient by this RN. Patient enjoyed having her mouth clean. Patient asks RN to not do blood pressure on upper arm because it hurts her. Patient skin on her buttocks is intact, and clean. Medication reconciled:  dilaudid, bisacodyl. #[id number]. Supplies ordered:  wipes, syringes, Calmoseptine, stat. lock, barrier film. #[id number]. Told Lian to contact hospice with questions, concerns, or change in patient status.

## 2022-02-24 NOTE — HOSPICE
RNHOWARD Zavala referral for  and LCSW to provide emotional/spiritual support as family struggling to process anticipatory grief and patient's journey towards end of life. Upon arrival, patient was resting. Daughter Lian present and supportive and son-in-law Jennifer Roger arrived later. Engaged in life review reflecting on patient's illness journey and daughter's experience as primary caregiver since 2016. Discussed hospice transition and the challenges related to patient's decline. Offered active listening processing daughter's concerns. Offered guidance for family to clarify patient's needs and enhance understanding of hospice and end of life journey without perseverating on negative aspects of grief or dying. Reviewed coping resources: patient and family are [place]; katie, prayer and their retired  remain significant sources of support. Encouraged daughter to engage in self-care strategies and normalized/validated grief. Overall, family coping well at this time and grateful for hospice team's support. Family will reach out for further support as needed.     Carroll et al. Assessing Spiritual Concerns in Palliative Care (Completed 2022)  Need for Meaning in the Face of Sufferin (patient and family still processing transition to hospice and decline)  Need for integrity, legacy, generativity: 1 (patient beloved and supported by daughter who has been caring for her since 2016)  Concerns about relationships: family and/or significant others: 1 (added caregivers are enhancing family's ability to cope w/ burnout)  Concern or fear of dying or death: 2 (family noted some uncertainty and anxiety about the unknown's of patient's death; patient and family sometimes struggle to speak openly about grief and challenges)  Issues related to making decisions about treatment: 0 (family and patient in agreement w/ hospice philosophy and care plan)  R/S Struggle: 0 (strength area: patient and family are strongly supported by their [address] [facility], active prayer life and retired ;  and LCSW available as needed)  0--no evidence of unmet need; (0*--further assessment needed to clarify needs)  1--some evidence of unmet need  2--substantial evidence of unmet need 3--evidence of severe unmet need  Total Spiritual Distress Score: 5/18 (low)

## 2022-02-25 NOTE — HOSPICE
PRN visit made due to pt not urinating post-catheter removal. Per pt's daughter Lian, silicone catheter was removed at 2pm today due to pt's discomfort. Pt has not voided since that time. Per dtr and PCG, pt's fluid intake has been minimal for several days, with ~8oz water consumed today. Pt also c/o discomfort in lower abdomen and groin. Upon assessment, pt's lower abdomen and labia are swollen. Per dtr, this swelling has been present for several days. Due to possible urine retention, new catheter inserted (14fr, 17YP balloon 052% silicone). Pt tolerated well. 50ml urine drained immediately. Instructed dtr and PCG to monitor output, encourage fluids and notify hospice if pt's discomfort increases, or if they have any other questions or concerns. Dtr and PCG verbalized understanding.

## 2022-02-25 NOTE — HOSPICE
Patient lying in bed upon arrival for visit; patient's daughter Jeromy French and caregiver present. Patient lethargic and does not open eyes but answers questions when asked. Patient denies pain at rest but does not tolerate touch or turning well. Family is medicating patient with Dilaudid Q4H. Patient on 4L O2 via NC continuously. Patient's daughter reports patient pulled at catheter/stat lock earlier this morning. Daughter replaced stat lock prior to this RN's arrival.  Daughter and caregiver observed new edema to patient's suprapubic area this afternoon. No apparent pain associated. Catheter balloon readjusted during visit; arevalo patent and draining. Dr. Author Hoskins notified; family to monitor and to follow up with hospice if any changes occur. Family is administering daily Bisacodyl suppositories; daily bowel movements. Poor appetite. Skin prep applied to bilateral heels (boggy) and zinc cream applied to sacrum (erythema). Reinforced hospice 24/7 for any concerns or change in patient's condition.

## 2022-03-01 NOTE — HOSPICE
Arrived to home and patient's daughter, Lian present. Patient has been complaining about the Bowling stat lock being too tight. Patient took her wig off yesterday, which the daughter reports as a huge step for her. She also tried to take her gown off. Patient restless. Daughter washed her head and put her sleep cap on. No suppository given to patient yesterday. Patient complaining of leg pain to daughter. Patient eating popsicles only. Oral care done by this RN. Mouth assessed. Sore in mouth healing well. Patient ate one \"baby\" bite of scrambled egg and toast.    Daughter asks RN if family should continue to plan to take trip to [place] in May. RN advises that they should and that [facility] is available if patient is still alive. Music therapist contacted to play soft music for patient. Daughter does not want people coming in with a lot of talking involved, but feels music would be soothing. Suppository administered and daughter gives additional pain medication. Patient is resting comfortably at end of visit. Medication ordered:  bisacodyl suppositories:  #[id number]. Supplies ordered:  toothettes, Chux, briefs, wipes, soft cannula:  #[id number]. Told Lian to contact hospice with questions, concerns, change in patient status. Paid caregiver, Roxana, present at visit.

## 2022-03-02 NOTE — HOSPICE
Patient's daughter Lian contacted RNCM due to mother complaining of pain in bladder area. Minimal dark urine out over last 10 hours per paid caregiver Aliya Tisha. Discussed patient's extensive allergy history and suggest it may be an allergy issue with latex catheter. RNCM replaced with #97 fr. silicone catheter with 10 ml balloon without difficulty. Patient tolerated procedure moderately. No urine return noted in tubing. Instructed paid caregiver to remove arevalo if no urine output noted by 2 pm. Assess for patient's ability to void in brief. If no urine output by 5 pm, call main triage number and request nurse visit to assess. Shaun Tisha verbalize understanding. Encouraged to call hospice 24/7 with any needs or change in patient's condition.

## 2022-03-10 NOTE — HOSPICE
Arrived to home and daughter, Lian and sister present along with spouse of Keli Harris. Patient has been restless since 4 am.  Patient in bed with noticeable discomfort. Heart rate is in the 140's, respirations are shallow and unlabored. Patient has periods of apnea and is given her morning doses of medication after RN assessment. Within 15 minutes, patient does seem more comfortable, but still not peaceful. Heart rate goes down into the 105-108 range. Patient feet and hands cyanotic. RN contacts Vannessa Elizabeth NP and medication doses are increased(see MAR). Patient had a small and watery, foul-smelling bowel movment last night. Urine output is less than 100 cc of dark irais urine overnight. Patient tried to get out of bed \"to go home\" at 4 am.  RN and paid caregiver, Roxana do gopal-care and suppository. No bowel movement or skin breakdown. Patient pale and hands and legs are cold to touch. Patient was yelling out \"help me\" through the night. Oral care complete by this RN. Socks and shirt changed. Heart rate erratic and often absent for periods of over 10 seconds. No medication is needed at this time. Supplies needed:  wipes-ordered by this RN. New medication orders reviewed with Prince. Active listening to family given. Encouraged family to sit with patient and take turns. Instruction given to contact hospice with questions, concerns, or change in patient status. Patient placed on 0-7.

## 2022-03-10 NOTE — HOSPICE
Arrived to patient home and many family members around bed of patient. They report calling triage last night due to the patient having bounding heartbeat out of her chest and it scared them. They also state she began to swell in her upper chest, neck and arms. They also could not get her to open her mouth to take medication. RN addresses all of the concerns, and family states understanding. RN demonstrates how to give medication to the patient when she is non-responsive by putting it in the gumline and slowly massaging gums. After 20 minutes, RN does thorough oral care on patient by using four baby washcloths(three to clean out mouth and one for face and eyes). Then RN uses toothettes to clean teeth more. When paid caregiver, Alejandra Kirk, arrives, RN and [place] Yelena do gopal-care, apply barrier film to heels, put new socks and shirt on patient, apply clean brief with calazime on buttocks. No breakdown of skin noted, but there is still slight edema noted in BL upper arms. Patient appears well-palliated, does respond slightly when turned. She also reaches for her wig when RN is putting on her shirt. RN assures her the wig will be adjusted correctly and combed. Oxygen safe chapstick applied. Tawnya has 75 cc dark irais urine. Patient restful last night. She did appear agitated when family was telling her sendy. Paid caregiver stayed with patient all night to allow family to rest.    Family needs no medication or supplies at this time. Instructed family to contact hospice main number with questions, concerns, or change in patient status. Active listening and support given to family. Caregiver, daughter Vanessa Arthur and  Ed Butt commended for their wonderful care to patient.

## 2022-03-11 NOTE — HOSPICE
PRN visit to assess 0-7 patient. Patient in home with paid caregiver, Cole Marquis and family:  daughter, Heather Sexton, son-in-law, Juana Coker, and grand daughter, Garry Brenner. Patient resting and appears well-palliated. Family states that patient had some shaking on and off when touched and her mouth was drawn to side and drooping and wanted to know if she had a stroke. Patient repositioned and does not have facial drooping. RN explains weakness of muscles in face and throat. Patient is getting regular doses of dilaudid and haldol. Patient heart rate is irregular, weak and measuring over 110 bpm.  Respirations are barely audible, but non-labored and 10 per minute. Patient BL hands are cyanotic and family reports on and off cold to hot. Family has paid caregivers around the clock. RN assistance is mostly with cleaning and repositioning patient. Routine for caring for patient at this week's PRN visits is listed below. #1:  If patient needs her medication doses, give slowly after a brief assessment so it can work before repositioning. #2:  Open tabbed brief and spray no-rinse foam.  Even though she is minimally responsive, tell her you are doing it. Also tell her everything being done throughout care. It seems to keep her calm. #3:  Do Bowling care. #4:  Turn patient and clean her bottom, which is starting to have some excoriation. Apply Calazime liberally. Put new Chux and brief on patient. #5:  With help, pull patient up in bed. #6:  Daughter will bring you clean and warm water, and many clean washcloths. Clean each eye, face, neck. Put on a fresh shirt. Please keep wig in place. Using warm washcloths, clean out her mouth and gums, also tongue. Then wipe lips with washcloth. Using blue sponges and clean water, brush her teeth front and back using clean sponge for each sweep and then put her Remedy chapstick on. #7:  Make sure she is elevated at 45 degrees and use her hair pick to comb her wig.    #8: Apply barrier film to heels, and then fresh socks. #9:  Apply fresh sheet and blanket. Patient likes to be clean and fresh and to know what is going on. She likes gentle and soft touch and movement. Daughter will help with how she likes it as we have been working on the best way to keep her comfortable. No supplies are needed at this time. Haldol ordered. Told family to contact hospice with questions, concerns or change in patient status.

## 2022-03-11 NOTE — HOSPICE
Music Therapy Assessment  Jonah Pennington  [address], [place]  Patient Telephone Number: [phone]  Sabianist Affiliation: Abilio  Language: English    Date: 3/7/22    Mental Status:   [  ] Alert [  ]  Quiver [  ]  Confused  [ X ] Minimally responsive  [  ] Sleeping    Communication Status: [  ] Impaired Speech [ X ] Nonverbal     Physical Status:   [  ] Oxygen in use  [  ] Hard of Hearing [  ] Vision Impaired   [  ] Ambulatory  [  ] Ambulatory with assistance Melvi.Angry  ] Non-ambulatory     Music Preferences, Background: __Hymns    Clinical Problem addressed: __Comfort for patient and family at EoL    Goal(s) met in session:  Physical/Pain management (Scale of 1-10):    Pre-session rating ___________    Post-session rating __________  [  ] Increased relaxation               [  ] Affected breathing patterns  [  ] Decreased muscle tension               [  ] Decreased agitation  [  ] Affected heart rate    [ X ] Increased alertness     Emotional/Psychological:  [  ] Increased self-expression   [  ] Decreased aggressive behavior   [  ] Decreased feelings of stress              [  ] Discussed healthy coping skills     [  ] Improved mood    [  ] Decreased withdrawn behavior     Social:  [  ] Decreased feelings of isolation/loneliness Melvi.Angry  ] Positive social interaction    [ X ] Provided support and/or comfort for family/friends    Spiritual:  Melvi.Angry  ] Spiritual support    [  ] Expressed peace  Melvi.Angry ] Expressed katie    [  ] Discussed beliefs    Techniques Utilized (Check all that apply):   [  ] Procedural support MT Melvi.Angry ] Music for relaxation             [ X ] Patient preferred music  [  ] Cecille analysis  Melvi.Angry  ] Song choice             [  ] Music for validation  [  ] Entrainment              [  ] Movement to music             [  ] Guided visualization  [  ] Chas Shelton  [  ] Patient instrument playing [  ] Nuris Kwok writing  [  ] Humaira Gatica along   [  ] Jluis Quevedo              [  ] Sensory stimulation  [  ] Active Listening  [ X ] Music for spiritual support [  ] Making of CDs as gifts    Session Observations:  June Arellano was visited at bedside with her daughter and caregiver in the room, making suggestions and caring for Ms. Angel to make sure she was comfortable. Ms. Pavan Calhoun daughter reminisced about her mom's younger years, and her close relationship with her sister Lian. Ms. Bri Springer responded verbally several times saying, \"that's pretty\" and moving her feet slightly to the rhythm of the music. Ms. Angel's daughter expressed gratitude for the visit, and blessings were offered for the patient and family. Thank you for the opportunity to provide music therapy to Ms. June Arellano.   [place], [place], [facility]   Music [address] Certified  Spiritual Care Services  Referral- based service

## 2022-03-12 NOTE — HOSPICE
PRN SN Visit for patient assessment and family support as patient nearing EOL. Daughter, Claribel Graham, son-in-law, and caregiver, Roxana present during visit. RN sat with daughter Claribel Graham and her  in living room to discuss current hospice POC and address their questions/concerns. Lian reports they have been spot checking SpO2 and last reading was 84%, respirations 9. Reviewed with Lian that we look at patient's outward display of comfort and not numbers to direct our care and plan. Lian verbalized understanding but says they are looking at the numbers to see if this would indicate when she is getting closer. Expressed to Lian that we would not want the numbers to create stress or anxiety for her. Lian says she understands they will change in a downward direction and she is accepting of this. It is Lian's desire to be present with her mother when she passes. Discussed that it is not easy to know exactly when she will pass and we want Lian to rest and take breaks so she does not become exhausted. Lian and her  appreciative of the information and time to share. They are calm and accepting of patient's status. Patient received appearing well palliated. Relaxed facial muscles and extremeties. No response to voice or light tactile stimulation. Respiration are 9/min, even and unlabored. No observed periods of apnea. Patient receiving hydromorphone 2 mg SL every 3 hours. Last dose 1 hour prior to SN visit. No nonverbal signs of pain observed. Repositioned patient for skin care, and with bending right knee patient displayed a frown/disapproving facial expression that immediately resolved once leg was straighted. Turned patient in log roll fashion, and she tolerated this well. No signs of pain displayed. Daughter and caregiver assisted RN with care. Skin care provided and barrier ointment applied. Daughter performed mouth care.   Patient with very minimal secretion heard at back of throat that occurs periodically. Oral mucosa is dry. Reviewed use of hyoscyamine SL tabs for secretions. Not initiating these at this time as the audible secretion is very minimal and not distressing to daughter or caregiver. They will monitor and contact hospice if secretions increase. Reviewed plan for SN visit tomorrow. Reviewed to contact hospice 24/7 if patient passes or if there are questions or concerns, symptom management needs. No supplies or medications needed at this time.

## 2022-03-13 NOTE — HOSPICE
Arrived to home to assist daughter and paid caregiver, Gregory Woods in oral care for patient. Patient is 0-7 and imminent. She has foam coming out of her nostrils. Paid caregiver is wiping nose. Patient appears comfortable, but her position of neck is making it difficult for her to get air. RN and paid caregiver put a pillow under her shoulders so her neck is not hyperextended. Patient appears much more comfortable. RN does oral care. Daughter requests that top patient is wearing be cut off, due to having a lot of secretions and medication on it. RN and caregiver remove shirt and clean patient face and neck area and put a fresh gown on her. Fresh sheet applied. Patient appears well-palliated, but is having additional secretions. Daughter requests that RN show her how to crush hyoscyamine and add water and pull up in syringe and administer. RN demonstrates. Oxygen tubing has kink in it, so RN puts a new cup and line on. Family states they are taking the oxygen off when patient has secretions coming out of her nose. RN educates that oxygen is for comfort only. Patient looks much more comfortable. She is having audible secretions in upper lungs. Respirations are more labored than a few days ago, but patient does not appear uncomfortable. No supplies are needed at this time. RNCM please order a higher concentration of Dilaudid in the morning so less will have to be given. This RN to follow up. Patient coloring is pale and gray. She has low urine output. Today, she has 100 cc in bag. Yesterday, she had a total of 50 cc. Daughter requests to not turn patient for applying barrier cream today. RN did educate daughter that turning patient slightly may be helpful if secretions build up. She states understanding and appreciation of visit. Told daughter to contact hospice main number if patient status changes or she has any concerns.

## 2022-03-14 NOTE — HOSPICE
Received patient in hospital bed in gown and brief under sheet and blanket. Patient resting with eyes closed. No nonverbal signs of pain. Breathing with increased effort, using accessory muscles. Daughter MASON Beal and granddaughter Samia Rhoades at bedside during visit. Lungs with rhonchi in all lobes on auscultation. Family report patient had frothy secretions from nose and mouth yesterday which have resolved with hyoscyamine. Hyoscyamine given by [facility] at 0945 am. Attempted mouth care, but patient clenched jaw tightly. Teeth cleaned on fronts with warm washcloth. Elevated head of bed and repositioned pillow behind patient to ease secretions. On assessment, no bowel sounds heard, no pedal pulses palpated. Some mottling noted to toes and fingers bilaterally, but extremities are warm to touch. Patient's temp is 99.4. Encouraged family to use cool compresses around neck and axilla if patient feels hot or clammy. Lian verbalizes understanding. RNCM administered dilaudid 1 ml in left cheek without difficulty at 10:05 am. Discussed end of life signs and symptoms and to call hospice if patient passes. Music therapist to visit today as well. Encouraged daughter to call hospice 24/7 with any needs or change in patient's condition. Lian agrees. No supplies or refills needed at this time.

## 2022-03-14 NOTE — HOSPICE
Music Therapy Assessment  [facility] SITE  [address], [place]    Patient Telephone Number: (tge) [phone]  Sikhism Affiliation: Abilio  Language: English    Date: 3/14/22    Mental Status:   [  ] Alert [  ] Horace Pall [  ]  Confused  [ X ] Minimally responsive  [  ] Sleeping    Communication Status: [  ] Impaired Speech [ X ] Nonverbal     Physical Status:   [  ] Oxygen in use  [  ] Hard of Hearing [  ] Vision Impaired   [  ] Ambulatory  [  ] Ambulatory with assistance [ X ] Non-ambulatory     Music Preferences, Background: Sacha Veronica, Choral Music    Clinical Problem addressed: Comfort to patient and family at end of life; spiritual support;    Goal(s) met in session:  Physical/Pain management (Scale of 1-10): Appeared comfortable and at peace  Social:  [  ] Decreased feelings of isolation/loneliness [  ] Positive social interaction    [ X ] Provided support and/or comfort for family/friends    Spiritual:  [ X ] Spiritual support    [  ] Expressed peace  [  ] Expressed katie    [  ] Discussed beliefs    Techniques Utilized (Check all that apply):   [  ] Procedural support MT [  ] Music for relaxation             [ X ] Patient preferred music  [  ] Cecille analysis  [  ] Song choice              [  ] Music for validation  [  ] Entrainment              [  ] Movement to music             [  ] Guided visualization  [  ] Onalee Button  [  ] Patient instrument playing [  ] Desiree Fernandez writing  [employer]  ] Sing along   [  ] Matthew Sonam              [  ] Sensory stimulation  [  ] Active Listening  [employer]  ] Music for spiritual support [  ] Making of CDs as gifts    Session Observations:  [facility] RENEE was lying in bed with her eyes closed and her family seated around her for the visit. Isaias's daughter, granddaughter and son in law are people of katie and music lovers and took turns selecting hymns, reading scripture, and sharing family stories about Sjs life and love of music.  They were encouraged to sing for dayron they knew and cared for, and Mrs. Angel's granddaughter held her hand, becoming tearful at times. The home was built with the plan for Mrs. Angel to live with her daughter and they are a close family, stating their appreciation for the music and the spiritual care many times throughout the session. Thank you for the opportunity to provide music therapy to Mrs. Vaibhav Dillard.   Kelsey Fitch, [facility]   [address] Certified  Spiritual Care Services  Referral- based service

## 2022-03-15 ENCOUNTER — HOME CARE VISIT (OUTPATIENT)
Dept: HOSPICE | Facility: HOSPICE | Age: 87
End: 2022-03-15
Payer: MEDICARE

## 2022-03-15 NOTE — HOSPICE
Lisa Mccann (: 1935) passed away peacefully at home this evening with family and PCG present. TOD 2200. Family grieving appropriately. Medications wasted per hospice protocol with patient's son-in-law Antonia Crain serving as witness. [employer] to serve patient and family. Joselin notified to pick-up equipment. Hospice staff and Dr. Robin Webb notified of patient's passing via this email. Roselia Murphy and Oscar Fall contacted by email requesting that someone contact pt's PCP Dr Jenlele Elizondo @  [phone]  to notify him of pt's death.